# Patient Record
Sex: FEMALE | Race: WHITE | NOT HISPANIC OR LATINO | URBAN - NONMETROPOLITAN AREA
[De-identification: names, ages, dates, MRNs, and addresses within clinical notes are randomized per-mention and may not be internally consistent; named-entity substitution may affect disease eponyms.]

---

## 2022-08-18 ENCOUNTER — OFFICE VISIT (OUTPATIENT)
Dept: FAMILY MEDICINE CLINIC | Age: 72
End: 2022-08-18

## 2022-08-18 ENCOUNTER — TELEPHONE (OUTPATIENT)
Dept: FAMILY MEDICINE CLINIC | Age: 72
End: 2022-08-18

## 2022-08-18 VITALS
DIASTOLIC BLOOD PRESSURE: 68 MMHG | WEIGHT: 149 LBS | BODY MASS INDEX: 24.83 KG/M2 | TEMPERATURE: 98.6 F | HEIGHT: 65 IN | HEART RATE: 113 BPM | RESPIRATION RATE: 20 BRPM | OXYGEN SATURATION: 98 % | SYSTOLIC BLOOD PRESSURE: 136 MMHG

## 2022-08-18 DIAGNOSIS — R05.9 COUGH: ICD-10-CM

## 2022-08-18 DIAGNOSIS — U07.1 FEVER DUE TO COVID-19: ICD-10-CM

## 2022-08-18 DIAGNOSIS — R50.81 FEVER DUE TO COVID-19: ICD-10-CM

## 2022-08-18 DIAGNOSIS — U07.1 COVID-19 VIRUS DETECTED: Primary | ICD-10-CM

## 2022-08-18 DIAGNOSIS — R13.10 ODYNOPHAGIA: ICD-10-CM

## 2022-08-18 PROCEDURE — 99214 OFFICE O/P EST MOD 30 MIN: CPT | Performed by: FAMILY MEDICINE

## 2022-08-18 RX ORDER — AMLODIPINE BESYLATE 5 MG/1
5 TABLET ORAL DAILY
COMMUNITY
End: 2022-09-08 | Stop reason: SDUPTHER

## 2022-08-18 RX ORDER — OMEPRAZOLE 20 MG/1
20 CAPSULE, DELAYED RELEASE ORAL DAILY
COMMUNITY
End: 2022-09-08 | Stop reason: SDUPTHER

## 2022-08-18 RX ORDER — PRENATAL VIT NO.126/IRON/FOLIC 28MG-0.8MG
1 TABLET ORAL DAILY
COMMUNITY

## 2022-08-18 RX ORDER — VENLAFAXINE 75 MG/1
75 TABLET ORAL
COMMUNITY
End: 2022-09-08 | Stop reason: SDUPTHER

## 2022-08-18 RX ORDER — ACETAMINOPHEN 325 MG/1
325 TABLET ORAL EVERY 4 HOURS PRN
COMMUNITY

## 2022-08-18 RX ORDER — PYRIDOXINE HCL (VITAMIN B6) 100 MG
4 TABLET ORAL DAILY
COMMUNITY

## 2022-08-18 RX ORDER — MULTIVIT-MIN/IRON/FOLIC ACID/K 18-600-40
1 CAPSULE ORAL DAILY
COMMUNITY

## 2022-08-18 RX ORDER — MELOXICAM 15 MG/1
15 TABLET ORAL DAILY PRN
COMMUNITY
End: 2022-09-08 | Stop reason: SDUPTHER

## 2022-08-18 RX ORDER — SIMVASTATIN 20 MG
20 TABLET ORAL NIGHTLY
COMMUNITY
End: 2022-09-08 | Stop reason: SDUPTHER

## 2022-08-18 RX ORDER — METOPROLOL SUCCINATE 100 MG/1
100 TABLET, EXTENDED RELEASE ORAL DAILY
COMMUNITY
End: 2022-09-08 | Stop reason: SDUPTHER

## 2022-08-18 NOTE — PROGRESS NOTES
Chief Complaint  Cough and Fever - onset 8/14/22    History of Present Illness   (Positive COVID test result brought in and reviewed)  Nose:  Epistaxis: no.  Nasal drainage: no. Nasal congestion: no. Post nasal drip: no.  Sinusitis:  Facial pain: no.  Headache: yes  Ear:  Pain: no.  Drainage: no.  Throat:   Soreness: no.  Dysphagia.  Lungs:  Cough: yes.  Pleuritic pain: no.  Smoker: no.  General:  Myalgias: no. Malaise: yes.   Fever: 101.0  Chills: no.   Gastroenterology:  Diarrhea: no. Nausea: no. Vomiting: no    Review of Systems : As in HPI    Objective     Vital Signs:   There were no vitals taken for this visit.  No current outpatient medications on file prior to visit.     No current facility-administered medications on file prior to visit.        No past medical history on file.   No past surgical history on file.   No family history on file.   Social History     Socioeconomic History   • Marital status: Single         No visits with results within 3 Month(s) from this visit.   Latest known visit with results is:   No results found for any previous visit.         Physical Exam  Constitutional:       Comments: No acute distress.  Good energy level.  Well kept   HENT:      Right Ear: Tympanic membrane, ear canal and external ear normal.      Left Ear: Tympanic membrane, ear canal and external ear normal.      Nose:      Right Sinus: No maxillary sinus tenderness or frontal sinus tenderness.      Left Sinus: No maxillary sinus tenderness or frontal sinus tenderness.      Comments: Breathing comfortably through nose.  2+ edema right, 2+ edema left.  Normal pink nasal mucosa, no mucous.     Mouth/Throat:      Comments: No oral lesions.  Moist mucous membranes. No mucous draining down the posterior oropharyngeal wall.  No erythema or exudate.  No laryngitis.  Eyes:      Conjunctiva/sclera: Conjunctivae normal.      Comments: No drainage.  No lid lesions or edema   Neck:      Comments: No neck mass.  Neck is  supple  Cardiovascular:      Rate and Rhythm: Normal rate and regular rhythm.   Pulmonary:      Comments: Clear to auscultation throughout.  Excellent air movement  Abdominal:      Comments: Bowel sounds normal pitch and frequency.  Abdomen is soft and nontender   Lymphadenopathy:      Head:      Right side of head: No preauricular, posterior auricular or occipital adenopathy.      Left side of head: No preauricular, posterior auricular or occipital adenopathy.      Cervical:      Right cervical: No superficial, deep or posterior cervical adenopathy.     Left cervical: No superficial, deep or posterior cervical adenopathy.   Skin:     General: Skin is warm and dry.      Comments: Warm, dry, pink. No exanthema or lesions          Result Review  Data Reviewed:{ Labs  Result Review  Imaging  Med Tab  Media :23}                     Assessment and Plan {CC Problem List  Visit Diagnosis  ROS  Review (Popup)  [facility] Maintenance  Quality  BestPractice  Medications  SmartSets  SnapShot Encounters  Media :23}   Diagnoses and all orders for this visit:    1. COVID-19 virus detected (Primary)  -     Nirmatrelvir & Ritonavir (Paxlovid) 20 x 150 MG & 10 x 100MG tablet therapy pack tablet; Take 3 tablets by mouth 2 (Two) Times a Day.  Dispense: 6 tablet; Refill: 0  MDM:  Marylin is within 5 days on onset of infection.  Due to her age and history of inflammatory arthritis, she is at risk of decompensation and meets criteria for antiviral agent. Patient was educated on Paxlovid and requests the agent to be prescribed.    2. Cough.      Cough is not interruptive to sleep.  Therefore, over-the-counter cough syrup or         drop may be taken as needed for comfort    3. Fever due to COVID-19      Tylenol 500 mg every 6 hrs, as needed.  Patient is instructed not to exceed 4 gm/day    4. Odynophagia      Instructions given on throat lozenges or Chloraseptic spray/lozengers. Patient is to seek medical care if it becomes  difficult to swallow          Follow Up {Instructions Charge Capture  Follow-up Communications :23}     Patient was given instructions and counseling regarding her condition or for health maintenance advice. Please see specific information pulled into the AVS (placed there by myself) if appropriate.    No follow-ups on file.    MDM     Florencia Turner MD, FNP-BC

## 2022-08-18 NOTE — TELEPHONE ENCOUNTER
PATIENT CALLED TO INFORM YOU THAT SHE IS CURRENTLY ON DAY 5 OF TESTING POSITIVE FOR COVID. SHE IS NOT HAVING ANY FEVERS, AND FEELS BETTER, BUT IS REQUESTING MEDICATION TO HELP TREAT THIS. PLEASE ADVISE.

## 2022-08-18 NOTE — PATIENT INSTRUCTIONS
Rest.  Self quarantine x 5 days.  Tylenol 500 mg every 6 hrs as needed for fever or body aches.  Call or go to the ER if there is worsening of your symptoms.

## 2022-09-08 ENCOUNTER — OFFICE VISIT (OUTPATIENT)
Dept: FAMILY MEDICINE CLINIC | Age: 72
End: 2022-09-08

## 2022-09-08 VITALS
RESPIRATION RATE: 16 BRPM | HEART RATE: 71 BPM | DIASTOLIC BLOOD PRESSURE: 76 MMHG | HEIGHT: 65 IN | OXYGEN SATURATION: 98 % | WEIGHT: 149.8 LBS | TEMPERATURE: 98.6 F | BODY MASS INDEX: 24.96 KG/M2 | SYSTOLIC BLOOD PRESSURE: 124 MMHG

## 2022-09-08 DIAGNOSIS — E78.2 MIXED HYPERLIPIDEMIA: ICD-10-CM

## 2022-09-08 DIAGNOSIS — K21.9 GASTROESOPHAGEAL REFLUX DISEASE WITHOUT ESOPHAGITIS: ICD-10-CM

## 2022-09-08 DIAGNOSIS — M06.4 INFLAMMATORY POLYARTHROPATHY: ICD-10-CM

## 2022-09-08 DIAGNOSIS — I10 BENIGN HYPERTENSION: Primary | ICD-10-CM

## 2022-09-08 DIAGNOSIS — F41.1 GAD (GENERALIZED ANXIETY DISORDER): ICD-10-CM

## 2022-09-08 DIAGNOSIS — F32.5 MAJOR DEPRESSIVE DISORDER IN REMISSION, UNSPECIFIED WHETHER RECURRENT: ICD-10-CM

## 2022-09-08 PROCEDURE — 80061 LIPID PANEL: CPT | Performed by: FAMILY MEDICINE

## 2022-09-08 PROCEDURE — 80053 COMPREHEN METABOLIC PANEL: CPT | Performed by: FAMILY MEDICINE

## 2022-09-08 PROCEDURE — 99214 OFFICE O/P EST MOD 30 MIN: CPT | Performed by: FAMILY MEDICINE

## 2022-09-08 RX ORDER — OMEPRAZOLE 20 MG/1
20 CAPSULE, DELAYED RELEASE ORAL DAILY
Qty: 90 CAPSULE | Refills: 0 | Status: SHIPPED | OUTPATIENT
Start: 2022-09-08 | End: 2022-09-20 | Stop reason: SDUPTHER

## 2022-09-08 RX ORDER — VENLAFAXINE 75 MG/1
75 TABLET ORAL
Qty: 90 TABLET | Refills: 0 | Status: SHIPPED | OUTPATIENT
Start: 2022-09-08 | End: 2022-09-20 | Stop reason: SDUPTHER

## 2022-09-08 RX ORDER — MELOXICAM 15 MG/1
15 TABLET ORAL DAILY PRN
Qty: 90 TABLET | Refills: 0 | Status: SHIPPED | OUTPATIENT
Start: 2022-09-08 | End: 2022-09-20 | Stop reason: SDUPTHER

## 2022-09-08 RX ORDER — SIMVASTATIN 20 MG
20 TABLET ORAL NIGHTLY
Qty: 90 TABLET | Refills: 0 | Status: SHIPPED | OUTPATIENT
Start: 2022-09-08 | End: 2022-09-20 | Stop reason: SDUPTHER

## 2022-09-08 RX ORDER — AMLODIPINE BESYLATE 5 MG/1
5 TABLET ORAL DAILY
Qty: 90 TABLET | Refills: 0 | Status: SHIPPED | OUTPATIENT
Start: 2022-09-08 | End: 2022-09-20 | Stop reason: SDUPTHER

## 2022-09-08 RX ORDER — METOPROLOL SUCCINATE 100 MG/1
100 TABLET, EXTENDED RELEASE ORAL DAILY
Qty: 90 TABLET | Refills: 0 | Status: SHIPPED | OUTPATIENT
Start: 2022-09-08 | End: 2022-09-20 | Stop reason: SDUPTHER

## 2022-09-08 NOTE — ASSESSMENT & PLAN NOTE
MDM: Hypertension is stable.  Limit sodium, elevate feet when needed, use compression knee high stockings (over-the-counter as discussed, including runners's stockings). Omit diuretic, at this time, as it can lower BP further and result in light headiness

## 2022-09-08 NOTE — PROGRESS NOTES
Venipuncture Blood Specimen Collection  Venipuncture performed in (R) arm via butterfly by Florencia Turner MD  with good hemostasis. Patient tolerated the procedure well without complications.   09/08/22   Marilou Mireles LPN

## 2022-09-08 NOTE — PROGRESS NOTES
Marylin Gay presents to [facility]      Chief Complaint  Hypertension, Hyperlipidemia  CMP, Lipid    HPI Doing well. Numbness lateral half of right foot.  Spine surgery did not decrease the numbness.   Mood is good.  No issues with medications.    Respiratory:  SOA:  no. Exertional dyspnea: no.  Dyspnea at rest: no.  Flights of stairs climbable: 2.  Cardiology:  Chest pain: no.  Dizziness: no. Easily fatigued: no.  Pedal edema: Rt leg swelling, intermittently since Total Rt knee replacement.  Occasional left lower leg swelling.  Both occur at the end of the day, if she hasn't rested .  Light-headiness: no.  Symptoms of claudication: no.  Orthopnea: no.  Palpitations: no. Tachycardia: no.  Ophthalmology:  Last eye exam date: 9/22 . Vision changes: none.  Gastroenterology:  Heartburn: no.  Nausea: no.      Current Outpatient Medications:   •  acetaminophen (TYLENOL) 325 MG tablet, Take 325 mg by mouth Every 4 (Four) Hours As Needed (INFLAMMATORY ARTHRITIS)., Disp: , Rfl:   •  amLODIPine (NORVASC) 5 MG tablet, Take 1 tablet by mouth Daily., Disp: 90 tablet, Rfl: 0  •  Ascorbic Acid (Vitamin C) 500 MG capsule, Take 1 capsule by mouth Daily., Disp: , Rfl:   •  Cranberry 500 MG capsule, Take 4 capsules by mouth Daily., Disp: , Rfl:   •  meloxicam (MOBIC) 15 MG tablet, Take 1 tablet by mouth Daily As Needed (ARTHRITIS)., Disp: 90 tablet, Rfl: 0  •  metoprolol succinate XL (TOPROL-XL) 100 MG 24 hr tablet, Take 1 tablet by mouth Daily., Disp: 90 tablet, Rfl: 0  •  omeprazole (priLOSEC) 20 MG capsule, Take 1 capsule by mouth Daily., Disp: 90 capsule, Rfl: 0  •  prenatal vitamin (prenatal, CLASSIC, vitamin) tablet, Take 1 tablet by mouth Daily., Disp: , Rfl:   •  simvastatin (ZOCOR) 20 MG tablet, Take 1 tablet by mouth Every Night., Disp: 90 tablet, Rfl: 0  •  venlafaxine (EFFEXOR) 75 MG tablet, Take 1 tablet by mouth Daily With Breakfast., Disp: 90 tablet, Rfl: 0     Allergies   Allergen  "Reactions   • Ertapenem Other (See Comments) and Delirium     VISION CHANGES (SEEING LIGHTS)   • Macrobid [Nitrofurantoin] Hives   • Methotrexate Other (See Comments)     TONGUE LESION    • Celebrex [Celecoxib] Other (See Comments)     ARTHRALGIA    • Ace Inhibitors Cough     (SIDE EFFECT)        Past Medical History:   Diagnosis Date   • Anxiety    • Depression    • GERD (gastroesophageal reflux disease)    • History of melanoma    • Hyperlipidemia    • Hypertension    • Inflammatory arthritis    • Insomnia    • Lumbar herniated disc     RIGHT SIDE - L5-S1       Past Surgical History:   Procedure Laterality Date   • APPENDECTOMY N/A    • CHOLECYSTECTOMY N/A    • ENDOMETRIAL ABLATION          History reviewed. No pertinent family history.     Social History     Socioeconomic History   • Marital status: Single   Tobacco Use   • Smoking status: Former Smoker     Packs/day: 0.50     Years: 10.00     Pack years: 5.00     Types: Cigarettes     Start date:      Quit date:      Years since quittin.7   • Smokeless tobacco: Never Used   Vaping Use   • Vaping Use: Never used   Substance and Sexual Activity   • Alcohol use: Never   • Drug use: Never   • Sexual activity: Defer        ROS: As in HPI      Objective   Vital Signs:  /76 (BP Location: Left arm, Patient Position: Sitting, Cuff Size: Adult)   Pulse 71   Temp 98.6 °F (37 °C) (Infrared)   Resp 16   Ht 165.1 cm (65\")   Wt 67.9 kg (149 lb 12.8 oz)   SpO2 98%   BMI 24.93 kg/m²   Estimated body mass index is 24.93 kg/m² as calculated from the following:    Height as of this encounter: 165.1 cm (65\").    Weight as of this encounter: 67.9 kg (149 lb 12.8 oz).        Physical Exam  Constitutional:       Comments: No acute distress. Well kept. Pleasant, cheerful, relaxed, interactive, coherant   Neck:      Vascular: No JVD.   Cardiovascular:      Rate and Rhythm: Normal rate and regular rhythm.      Pulses:           Carotid " pulses are 2+ on the right side and 2+ on the left side.       Radial pulses are 2+ on the right side and 2+ on the left side.        Femoral pulses are 0 on the right side and 0 on the left side.       Popliteal pulses are 2+ on the right side and 2+ on the left side.        Dorsalis pedis pulses are detected w/ Doppler on the right side and 2+ on the left side.        Posterior tibial pulses are 2+ on the right side and 2+ on the left side.      Heart sounds: Normal heart sounds.      Comments: No lower extremity varicosities  Pulmonary:      Effort: Pulmonary effort is normal.      Breath sounds: Normal breath sounds.   Musculoskeletal:      Right lower leg: No edema.      Left lower leg: No edema.   Skin:     Comments: No dystrophic toenails. No integument changes of the shins or feet.  Toes are warm and pink         Result Review :                    Assessment and Plan   Diagnoses and all orders for this visit:    1. Benign hypertension (Primary)  Assessment & Plan:  MDM: Hypertension is stable.  Limit sodium, elevate feet when needed, use compression knee high stockings (over-the-counter as discussed, including runners's stockings). Omit diuretic, at this time, as it can lower BP further and result in light headiness    Orders:  -     amLODIPine (NORVASC) 5 MG tablet; Take 1 tablet by mouth Daily.  Dispense: 90 tablet; Refill: 0  -     metoprolol succinate XL (TOPROL-XL) 100 MG 24 hr tablet; Take 1 tablet by mouth Daily.  Dispense: 90 tablet; Refill: 0  -     Comprehensive metabolic panel    2. Mixed hyperlipidemia  -     simvastatin (ZOCOR) 20 MG tablet; Take 1 tablet by mouth Every Night.  Dispense: 90 tablet; Refill: 0  -     Lipid Panel    3. HEAVEN (generalized anxiety disorder)    4. Major depressive disorder in remission, unspecified whether recurrent (HCC)  -     venlafaxine (EFFEXOR) 75 MG tablet; Take 1 tablet by mouth Daily With Breakfast.  Dispense: 90 tablet; Refill: 0    5. Gastroesophageal reflux  disease without esophagitis  -     omeprazole (priLOSEC) 20 MG capsule; Take 1 capsule by mouth Daily.  Dispense: 90 capsule; Refill: 0    6. Inflammatory polyarthropathy (HCC)  -     meloxicam (MOBIC) 15 MG tablet; Take 1 tablet by mouth Daily As Needed (ARTHRITIS).  Dispense: 90 tablet; Refill: 0           Follow Up   Return in about 3 months (around 12/8/2022) for Cor.    Patient was given instructions and counseling regarding her condition or for health maintenance advice. Please see specific information pulled into the AVS if appropriate.   There are no Patient Instructions on file for this visit.

## 2022-09-09 LAB
ALBUMIN SERPL-MCNC: 4.6 G/DL (ref 3.5–5.2)
ALBUMIN/GLOB SERPL: 2.1 G/DL
ALP SERPL-CCNC: 92 U/L (ref 39–117)
ALT SERPL W P-5'-P-CCNC: 16 U/L (ref 1–33)
ANION GAP SERPL CALCULATED.3IONS-SCNC: 11.8 MMOL/L (ref 5–15)
AST SERPL-CCNC: 22 U/L (ref 1–32)
BILIRUB SERPL-MCNC: 0.3 MG/DL (ref 0–1.2)
BUN SERPL-MCNC: 18 MG/DL (ref 8–23)
BUN/CREAT SERPL: 18.6 (ref 7–25)
CALCIUM SPEC-SCNC: 9.5 MG/DL (ref 8.6–10.5)
CHLORIDE SERPL-SCNC: 108 MMOL/L (ref 98–107)
CHOLEST SERPL-MCNC: 179 MG/DL (ref 0–200)
CO2 SERPL-SCNC: 24.2 MMOL/L (ref 22–29)
CREAT SERPL-MCNC: 0.97 MG/DL (ref 0.57–1)
EGFRCR SERPLBLD CKD-EPI 2021: 63 ML/MIN/1.73
GLOBULIN UR ELPH-MCNC: 2.2 GM/DL
GLUCOSE SERPL-MCNC: 67 MG/DL (ref 65–99)
HDLC SERPL-MCNC: 40 MG/DL (ref 40–60)
LDLC SERPL CALC-MCNC: 99 MG/DL (ref 0–100)
LDLC/HDLC SERPL: 2.3 {RATIO}
POTASSIUM SERPL-SCNC: 4.4 MMOL/L (ref 3.5–5.2)
PROT SERPL-MCNC: 6.8 G/DL (ref 6–8.5)
SODIUM SERPL-SCNC: 144 MMOL/L (ref 136–145)
TRIGL SERPL-MCNC: 235 MG/DL (ref 0–150)
VLDLC SERPL-MCNC: 40 MG/DL (ref 5–40)

## 2022-09-20 DIAGNOSIS — I10 BENIGN HYPERTENSION: ICD-10-CM

## 2022-09-20 DIAGNOSIS — F32.5 MAJOR DEPRESSIVE DISORDER IN REMISSION, UNSPECIFIED WHETHER RECURRENT: ICD-10-CM

## 2022-09-20 DIAGNOSIS — E78.2 MIXED HYPERLIPIDEMIA: ICD-10-CM

## 2022-09-20 DIAGNOSIS — K21.9 GASTROESOPHAGEAL REFLUX DISEASE WITHOUT ESOPHAGITIS: ICD-10-CM

## 2022-09-20 DIAGNOSIS — M06.4 INFLAMMATORY POLYARTHROPATHY: ICD-10-CM

## 2022-09-20 RX ORDER — MELOXICAM 15 MG/1
15 TABLET ORAL DAILY PRN
Qty: 90 TABLET | Refills: 0 | Status: SHIPPED | OUTPATIENT
Start: 2022-09-20 | End: 2022-12-08 | Stop reason: SDUPTHER

## 2022-09-20 RX ORDER — AMLODIPINE BESYLATE 5 MG/1
5 TABLET ORAL DAILY
Qty: 90 TABLET | Refills: 0 | Status: SHIPPED | OUTPATIENT
Start: 2022-09-20 | End: 2022-12-08 | Stop reason: SDUPTHER

## 2022-09-20 RX ORDER — METOPROLOL SUCCINATE 100 MG/1
100 TABLET, EXTENDED RELEASE ORAL DAILY
Qty: 90 TABLET | Refills: 0 | Status: SHIPPED | OUTPATIENT
Start: 2022-09-20 | End: 2022-12-08 | Stop reason: SDUPTHER

## 2022-09-20 RX ORDER — OMEPRAZOLE 20 MG/1
20 CAPSULE, DELAYED RELEASE ORAL DAILY
Qty: 90 CAPSULE | Refills: 0 | Status: SHIPPED | OUTPATIENT
Start: 2022-09-20 | End: 2022-12-08 | Stop reason: SDUPTHER

## 2022-09-20 RX ORDER — SIMVASTATIN 20 MG
20 TABLET ORAL NIGHTLY
Qty: 90 TABLET | Refills: 0 | Status: SHIPPED | OUTPATIENT
Start: 2022-09-20 | End: 2022-12-08 | Stop reason: SDUPTHER

## 2022-09-20 RX ORDER — VENLAFAXINE 75 MG/1
75 TABLET ORAL
Qty: 90 TABLET | Refills: 0 | Status: SHIPPED | OUTPATIENT
Start: 2022-09-20 | End: 2022-10-04

## 2022-09-20 NOTE — TELEPHONE ENCOUNTER
Rx Refill Note  Requested Prescriptions     Pending Prescriptions Disp Refills   • amLODIPine (NORVASC) 5 MG tablet 90 tablet 0     Sig: Take 1 tablet by mouth Daily.   • meloxicam (MOBIC) 15 MG tablet 90 tablet 0     Sig: Take 1 tablet by mouth Daily As Needed (ARTHRITIS).   • omeprazole (priLOSEC) 20 MG capsule 90 capsule 0     Sig: Take 1 capsule by mouth Daily.   • metoprolol succinate XL (TOPROL-XL) 100 MG 24 hr tablet 90 tablet 0     Sig: Take 1 tablet by mouth Daily.   • simvastatin (ZOCOR) 20 MG tablet 90 tablet 0     Sig: Take 1 tablet by mouth Every Night.   • venlafaxine (EFFEXOR) 75 MG tablet 90 tablet 0     Sig: Take 1 tablet by mouth Daily With Breakfast.      Last office visit with prescribing clinician: 9/8/2022      Next office visit with prescribing clinician: 12/8/2022            Jia Wilson Rep  09/20/22, 12:50 EDT

## 2022-10-04 DIAGNOSIS — F32.5 MAJOR DEPRESSIVE DISORDER IN REMISSION, UNSPECIFIED WHETHER RECURRENT: Primary | ICD-10-CM

## 2022-10-04 RX ORDER — VENLAFAXINE HYDROCHLORIDE 75 MG/1
75 CAPSULE, EXTENDED RELEASE ORAL DAILY
Qty: 90 CAPSULE | Refills: 0 | Status: SHIPPED | OUTPATIENT
Start: 2022-10-04 | End: 2022-12-08 | Stop reason: SDUPTHER

## 2022-10-04 NOTE — TELEPHONE ENCOUNTER
Received fax re: this pt's Effexor 75 mg. Fax advised that patient was previously on 75 mg XR capsules and that 75 mg tablets (not XR) were sent. Per Dr. Turner, Effexor 75 mg XR to be sent to [facility].   Rx Refill Note  Requested Prescriptions     Pending Prescriptions Disp Refills   • venlafaxine XR (Effexor XR) 75 MG 24 hr capsule 90 capsule 0     Sig: Take 1 capsule by mouth Daily.      Last office visit with prescribing clinician: 9/8/2022      Next office visit with prescribing clinician: 12/8/2022            Marilou Mireles LPN  10/04/22, 14:01 EDT

## 2022-10-12 DIAGNOSIS — E78.2 MIXED HYPERLIPIDEMIA: ICD-10-CM

## 2022-10-12 RX ORDER — SIMVASTATIN 20 MG
20 TABLET ORAL NIGHTLY
Qty: 30 TABLET | Refills: 0 | OUTPATIENT
Start: 2022-10-12

## 2022-10-12 NOTE — TELEPHONE ENCOUNTER
PATIENT IS CALLING BACK IN TO CHECK ON STATUS OF THIS MEDICATION, WANTED TO MAKE SURE IT HAS ALL BEEN WORKED OUT BEFORE SHE RUNS OUT.      PLEASE ADVISE    CALLBACK NUMBER IS  [phone]

## 2022-10-12 NOTE — TELEPHONE ENCOUNTER
Rx Refill Note      Requested Prescriptions     Pending Prescriptions Disp Refills   • simvastatin (ZOCOR) 20 MG tablet 30 tablet 0     Sig: Take 1 tablet by mouth Every Night.      Last office visit with prescribing clinician: 9/8/2022      Next office visit with prescribing clinician: 12/8/2022            Lynnette Blair MA  10/12/22, 14:18 EDT

## 2022-10-12 NOTE — TELEPHONE ENCOUNTER
Caller: Marylin Gay    Relationship: Self    Best call back number: [phone]      Requested Prescriptions:   Requested Prescriptions     Pending Prescriptions Disp Refills   • simvastatin (ZOCOR) 20 MG tablet 90 tablet 0     Sig: Take 1 tablet by mouth Every Night.        Pharmacy where request should be sent: [facility] - [id number] - [phone]  - [phone] FX     Additional details provided by patient: HAS MORE THAN 3 DAYS LEFT.    Does the patient have less than a 3 day supply:  [] Yes  [x] No    Ava Alvarez, PCT   10/12/22 14:09 EDT

## 2022-11-23 ENCOUNTER — OFFICE VISIT (OUTPATIENT)
Dept: FAMILY MEDICINE CLINIC | Age: 72
End: 2022-11-23

## 2022-11-23 VITALS
DIASTOLIC BLOOD PRESSURE: 70 MMHG | OXYGEN SATURATION: 97 % | SYSTOLIC BLOOD PRESSURE: 118 MMHG | WEIGHT: 154 LBS | HEART RATE: 58 BPM | HEIGHT: 65 IN | TEMPERATURE: 98.4 F | RESPIRATION RATE: 18 BRPM | BODY MASS INDEX: 25.66 KG/M2

## 2022-11-23 DIAGNOSIS — H69.82 EUSTACHIAN TUBE DYSFUNCTION, LEFT: ICD-10-CM

## 2022-11-23 DIAGNOSIS — H92.09 EARACHE: Primary | ICD-10-CM

## 2022-11-23 DIAGNOSIS — B30.9 VIRAL CONJUNCTIVITIS OF LEFT EYE: ICD-10-CM

## 2022-11-23 LAB
EXPIRATION DATE: NORMAL
FLUAV AG UPPER RESP QL IA.RAPID: NOT DETECTED
FLUBV AG UPPER RESP QL IA.RAPID: NOT DETECTED
INTERNAL CONTROL: NORMAL
Lab: NORMAL
SARS-COV-2 AG UPPER RESP QL IA.RAPID: NOT DETECTED

## 2022-11-23 PROCEDURE — 87428 SARSCOV & INF VIR A&B AG IA: CPT | Performed by: FAMILY MEDICINE

## 2022-11-23 PROCEDURE — 99214 OFFICE O/P EST MOD 30 MIN: CPT | Performed by: FAMILY MEDICINE

## 2022-11-23 NOTE — PROGRESS NOTES
"Chief Complaint  Earache (Started 3-4 days ago) and Conjunctivitis (Started yesterday)    History of Present Illness   Ears:    Pain: no.  Drainage: no  Nose:  Epistaxis: intermittent. Nasal drainage: yes. Nasal congestion: no. Post nasal drip: no. Sneezing: no.  Sinusitis:  Facial pain: no.  Headache: no.   Ear:  Pain: left.  Drainage: no. \"Decreased hearing in the left\"  Throat:   Soreness: no.  Dysphagia.  Lungs:  Cough: intermittent.  Pleuritic pain: no.  Smoker: no.  General:  Myalgias: no. Malaise: yes.   Fever: no.  Chills: no.   Gastroenterology:  Diarrhea: no. Nausea: no. Vomiting: no  EYES:  Yellow drainage from the left eye    Objective     Vital Signs:   /70 (BP Location: Left arm, Patient Position: Sitting, Cuff Size: Adult)   Pulse 58   Temp 98.4 °F (36.9 °C) (Temporal)   Resp 18   Ht 165.1 cm (65\")   Wt 69.9 kg (154 lb)   SpO2 97%   BMI 25.63 kg/m²   Current Outpatient Medications on File Prior to Visit   Medication Sig Dispense Refill   • acetaminophen (TYLENOL) 325 MG tablet Take 325 mg by mouth Every 4 (Four) Hours As Needed (INFLAMMATORY ARTHRITIS).     • amLODIPine (NORVASC) 5 MG tablet Take 1 tablet by mouth Daily. 90 tablet 0   • Ascorbic Acid (Vitamin C) 500 MG capsule Take 1 capsule by mouth Daily.     • Cranberry 500 MG capsule Take 4 capsules by mouth Daily.     • meloxicam (MOBIC) 15 MG tablet Take 1 tablet by mouth Daily As Needed (ARTHRITIS). 90 tablet 0   • metoprolol succinate XL (TOPROL-XL) 100 MG 24 hr tablet Take 1 tablet by mouth Daily. 90 tablet 0   • omeprazole (priLOSEC) 20 MG capsule Take 1 capsule by mouth Daily. 90 capsule 0   • prenatal vitamin (prenatal, CLASSIC, vitamin) tablet Take 1 tablet by mouth Daily.     • simvastatin (ZOCOR) 20 MG tablet Take 1 tablet by mouth Every Night. 90 tablet 0   • venlafaxine XR (Effexor XR) 75 MG 24 hr capsule Take 1 capsule by mouth Daily. 90 capsule 0     No current facility-administered medications on file prior to visit. "        Past Medical History:   Diagnosis Date   • Anxiety    • Depression    • GERD (gastroesophageal reflux disease)    • History of melanoma    • Hyperlipidemia    • Hypertension    • Inflammatory arthritis    • Insomnia    • Lumbar herniated disc     RIGHT SIDE - L5-S1      Past Surgical History:   Procedure Laterality Date   • APPENDECTOMY N/A    • BLADDER SUSPENSION     • CARPAL TUNNEL RELEASE Bilateral     2007   • CATARACT EXTRACTION Bilateral    • CHOLECYSTECTOMY N/A    • COLONOSCOPY  2020    Negative   • ENDOMETRIAL ABLATION     • LAMINECTOMY AND MICRODISCECTOMY LUMBAR SPINE  2021    herniated disc   • REPLACEMENT TOTAL KNEE Right 2022   • TOTAL LAPAROSCOPIC HYSTERECTOMY        Family History   Problem Relation Age of Onset   • Diabetes Mother    • Heart failure Mother    • Ulcers Father    • Skin cancer Father    • Other Father         Heart issues   • No Known Problems Sister    • Arthritis Sister    • Alzheimer's disease Sister    • No Known Problems Sister    • Lung cancer Brother    • Other Brother         Heart issues   • Hypertension Brother    • Post-traumatic stress disorder Brother    • Other Brother         Heart issues   • Diabetes Brother    • No Known Problems Maternal Grandmother       Social History     Socioeconomic History   • Marital status: Single   Tobacco Use   • Smoking status: Former     Packs/day: 0.50     Years: 10.00     Pack years: 5.00     Types: Cigarettes     Start date:      Quit date:      Years since quittin.9     Passive exposure: Past   • Smokeless tobacco: Never   Vaping Use   • Vaping Use: Never used   Substance and Sexual Activity   • Alcohol use: Yes     Comment: Rare occasion   • Drug use: Never   • Sexual activity: Defer         Office Visit on 2022   Component Date Value Ref Range Status   • SARS Antigen 2022 Not Detected  Not Detected, Presumptive Negative Final   • Influenza A Antigen  DARLINE 11/23/2022 Not Detected  Not Detected Final   • Influenza B Antigen DARLINE 11/23/2022 Not Detected  Not Detected Final   • Internal Control 11/23/2022 Passed  Passed Final   • Lot Number 11/23/2022 [id number]   Final   • Expiration Date 11/23/2022 06-   Final   Office Visit on 09/08/2022   Component Date Value Ref Range Status   • Glucose 09/08/2022 67  65 - 99 mg/dL Final   • BUN 09/08/2022 18  8 - 23 mg/dL Final   • Creatinine 09/08/2022 0.97  0.57 - 1.00 mg/dL Final   • Sodium 09/08/2022 144  136 - 145 mmol/L Final   • Potassium 09/08/2022 4.4  3.5 - 5.2 mmol/L Final   • Chloride 09/08/2022 108 (H)  98 - 107 mmol/L Final   • CO2 09/08/2022 24.2  22.0 - 29.0 mmol/L Final   • Calcium 09/08/2022 9.5  8.6 - 10.5 mg/dL Final   • Total Protein 09/08/2022 6.8  6.0 - 8.5 g/dL Final   • Albumin 09/08/2022 4.60  3.50 - 5.20 g/dL Final   • ALT (SGPT) 09/08/2022 16  1 - 33 U/L Final   • AST (SGOT) 09/08/2022 22  1 - 32 U/L Final   • Alkaline Phosphatase 09/08/2022 92  39 - 117 U/L Final   • Total Bilirubin 09/08/2022 0.3  0.0 - 1.2 mg/dL Final   • Globulin 09/08/2022 2.2  gm/dL Final   • A/G Ratio 09/08/2022 2.1  g/dL Final   • BUN/Creatinine Ratio 09/08/2022 18.6  7.0 - 25.0 Final   • Anion Gap 09/08/2022 11.8  5.0 - 15.0 mmol/L Final   • eGFR 09/08/2022 63.0  >60.0 mL/min/1.73 Final    National Kidney Foundation and American Society of Nephrology (ASN) Task Force recommended calculation based on the Chronic Kidney Disease Epidemiology Collaboration (CKD-EPI) equation refit without adjustment for race.   • Total Cholesterol 09/08/2022 179  0 - 200 mg/dL Final   • Triglycerides 09/08/2022 235 (H)  0 - 150 mg/dL Final   • HDL Cholesterol 09/08/2022 40  40 - 60 mg/dL Final   • LDL Cholesterol  09/08/2022 99  0 - 100 mg/dL Final   • VLDL Cholesterol 09/08/2022 40  5 - 40 mg/dL Final   • LDL/HDL Ratio 09/08/2022 2.30   Final         Physical Exam   General:  No acute distress,  good energy level, interactive, cooperative  with exam.  Eyes:  Normal.  Ears:  Canals: normal  TM: intact, no erythema, air fluid level, bulging, dilated vessels  Nose:  Breathing comfortably through the nose. Scant clear mucous. Normal pink mucosa, 2+ edema bilaterally.  Sinuses:  Frontal: non-tender  Maxillary: non-tender  Buccal Cavity:  Moist membranes  No oral lesions  Throat:  No mucous draining down the posterior oropharyngeal wall. No erythema. No exudate.  No laryngitis  Lungs:  Clear to auscultation bilaterally, excellent air movement throughout  Voice strong and clear  Cor:  Heart regular rate and rhythm without murmur   Abdomen:  Bowel sounds are normal pitch and frequency x 4 quadrants  No tenderness. No palpable mass.  Lymph nodes:  No enlarged anterior or posterior cervical lymph nodes  Neck:  Supple.  No palpable mass  Integument:  Warm, dry, pink without exanthema   Eyes:  Very minimal left orbit conjunctival injection. No pseudomembranes, lid lesions, drainage or lid edema/erythema      Result Review  Data Reviewed:{ Labs  Result Review  Imaging  Med Tab  Media :23}                     Assessment and Plan {CC Problem List  Visit Diagnosis  ROS  Review (Popup)  Health Maintenance  Quality  BestPractice  Medications  SmartSets  SnapShot Encounters  Media :23}   Diagnoses and all orders for this visit:    1. Earache (Primary)  Comments:  MDM: Secondary to eustachian tube dysfunction from URI  Orders:  -     POCT SARS-CoV-2 Antigen DARLINE + Flu    2. Eustachian tube dysfunction, left  Comments:  MDM: Obtain Flonase, 1 spray ea side daily.  Triple antibiotic ointment to mucosa, at bedtime, if bleeding occurs    3. Viral conjunctivitis of left eye  Comments:  MDM: Use warm moist compress 5 min hourly until bedtime.  Call if no resolution          Follow Up {Instructions Charge Capture  Follow-up Communications :23}     Patient was given instructions and counseling regarding her condition or for health maintenance advice. Please see  specific information pulled into the AVS (placed there by myself) if appropriate.    No follow-ups on file.    MDM     Florencia Turner MD, FNP-BC

## 2022-12-08 ENCOUNTER — OFFICE VISIT (OUTPATIENT)
Dept: FAMILY MEDICINE CLINIC | Age: 72
End: 2022-12-08

## 2022-12-08 VITALS
WEIGHT: 153.8 LBS | OXYGEN SATURATION: 98 % | HEIGHT: 65 IN | DIASTOLIC BLOOD PRESSURE: 80 MMHG | BODY MASS INDEX: 25.62 KG/M2 | SYSTOLIC BLOOD PRESSURE: 130 MMHG | RESPIRATION RATE: 20 BRPM | TEMPERATURE: 98.2 F | HEART RATE: 75 BPM

## 2022-12-08 DIAGNOSIS — M06.4 INFLAMMATORY POLYARTHROPATHY: ICD-10-CM

## 2022-12-08 DIAGNOSIS — I10 BENIGN HYPERTENSION: Primary | ICD-10-CM

## 2022-12-08 DIAGNOSIS — E78.2 MIXED HYPERLIPIDEMIA: ICD-10-CM

## 2022-12-08 DIAGNOSIS — Z23 ENCOUNTER FOR IMMUNIZATION: ICD-10-CM

## 2022-12-08 DIAGNOSIS — F32.5 MAJOR DEPRESSIVE DISORDER IN REMISSION, UNSPECIFIED WHETHER RECURRENT: ICD-10-CM

## 2022-12-08 DIAGNOSIS — K21.9 GASTROESOPHAGEAL REFLUX DISEASE WITHOUT ESOPHAGITIS: ICD-10-CM

## 2022-12-08 PROCEDURE — 90662 IIV NO PRSV INCREASED AG IM: CPT | Performed by: FAMILY MEDICINE

## 2022-12-08 PROCEDURE — G0008 ADMIN INFLUENZA VIRUS VAC: HCPCS | Performed by: FAMILY MEDICINE

## 2022-12-08 PROCEDURE — 99214 OFFICE O/P EST MOD 30 MIN: CPT | Performed by: FAMILY MEDICINE

## 2022-12-08 RX ORDER — SIMVASTATIN 20 MG
20 TABLET ORAL NIGHTLY
Qty: 90 TABLET | Refills: 0 | Status: CANCELLED | OUTPATIENT
Start: 2022-12-08

## 2022-12-08 RX ORDER — VENLAFAXINE HYDROCHLORIDE 75 MG/1
75 CAPSULE, EXTENDED RELEASE ORAL DAILY
Qty: 90 CAPSULE | Refills: 0 | Status: CANCELLED | OUTPATIENT
Start: 2022-12-08

## 2022-12-08 RX ORDER — OMEPRAZOLE 20 MG/1
20 CAPSULE, DELAYED RELEASE ORAL 2 TIMES DAILY
Qty: 90 CAPSULE | Refills: 0 | Status: CANCELLED | OUTPATIENT
Start: 2022-12-08

## 2022-12-08 RX ORDER — MELOXICAM 15 MG/1
15 TABLET ORAL DAILY PRN
Qty: 90 TABLET | Refills: 0 | Status: CANCELLED | OUTPATIENT
Start: 2022-12-08

## 2022-12-08 RX ORDER — VENLAFAXINE HYDROCHLORIDE 75 MG/1
75 CAPSULE, EXTENDED RELEASE ORAL DAILY
Qty: 90 CAPSULE | Refills: 0 | Status: SHIPPED | OUTPATIENT
Start: 2022-12-08 | End: 2023-01-10 | Stop reason: SDUPTHER

## 2022-12-08 RX ORDER — METOPROLOL SUCCINATE 100 MG/1
100 TABLET, EXTENDED RELEASE ORAL DAILY
Qty: 90 TABLET | Refills: 0 | Status: CANCELLED | OUTPATIENT
Start: 2022-12-08

## 2022-12-08 RX ORDER — OMEPRAZOLE 20 MG/1
20 CAPSULE, DELAYED RELEASE ORAL 2 TIMES DAILY
Qty: 90 CAPSULE | Refills: 0 | Status: SHIPPED | OUTPATIENT
Start: 2022-12-08 | End: 2023-03-02 | Stop reason: SDUPTHER

## 2022-12-08 RX ORDER — SIMVASTATIN 20 MG
20 TABLET ORAL NIGHTLY
Qty: 90 TABLET | Refills: 0 | Status: SHIPPED | OUTPATIENT
Start: 2022-12-08

## 2022-12-08 RX ORDER — METOPROLOL SUCCINATE 100 MG/1
100 TABLET, EXTENDED RELEASE ORAL DAILY
Qty: 90 TABLET | Refills: 0 | Status: SHIPPED | OUTPATIENT
Start: 2022-12-08

## 2022-12-08 RX ORDER — AMLODIPINE BESYLATE 5 MG/1
5 TABLET ORAL DAILY
Qty: 90 TABLET | Refills: 0 | Status: SHIPPED | OUTPATIENT
Start: 2022-12-08

## 2022-12-08 RX ORDER — MELOXICAM 15 MG/1
15 TABLET ORAL DAILY PRN
Qty: 90 TABLET | Refills: 0 | Status: SHIPPED | OUTPATIENT
Start: 2022-12-08

## 2022-12-08 RX ORDER — VENLAFAXINE HYDROCHLORIDE 37.5 MG/1
37.5 CAPSULE, EXTENDED RELEASE ORAL DAILY
Qty: 30 CAPSULE | Refills: 0 | Status: SHIPPED | OUTPATIENT
Start: 2022-12-08 | End: 2023-01-10 | Stop reason: SDUPTHER

## 2022-12-08 RX ORDER — AMLODIPINE BESYLATE 5 MG/1
5 TABLET ORAL DAILY
Qty: 90 TABLET | Refills: 0 | Status: CANCELLED | OUTPATIENT
Start: 2022-12-08

## 2022-12-08 NOTE — ASSESSMENT & PLAN NOTE
AUTHORIZATION FOR SURGICAL OPERATION OR OTHER PROCEDURE    1. I hereby authorize Dr. Dimas/Mario, and [facility] staff assigned to my case to perform the following operation and/or procedure at the [facility] site:    _______________________________________________________________________________________________    Right knee cortisone injection  _______________________________________________________________________________________________    2.  My physician has explained the nature and purpose of the operation or other procedure, possible alternative methods of treatment, the risks involved, and the possibility of complication to me.  I acknowledge that no guarantee has been made as to the result that may be obtained.  3.  I recognize that, during the course of this operation, or other procedure, unforseen conditions may necessitate additional or different procedure than those listed above.  I, therefore, further authorize and request that the above named physician, his/her physician assistants or designees perform such procedures as are, in his/her professional opinion, necessary and desirable.  4.  Any tissue or organs removed in the operation or other procedure may be disposed of by and at the discretion of the [facility] [address] [facility] and [facility].  5.  I understand that in the event of a medical emergency, I will be transported by local paramedics to [facility] or other hospital emergency department.  6.  I certify that I have read and fully understand the above consent to operation and/or other procedure.    7.  I acknowledge that my physician has explained sedation/analgesia administration to me including the risks and benefits.  I consent to the administration of sedation/analgesia as may be necessary or desirable in the judgement of my physician.    Witness signature: ___________________________________________________ Date:   MDM: Patient's depression is not in control.  Slowly titrate Rx as indicated   ______/______/_____                    Time:  ________ A.M.  P.M.       Patient Name:  ______________________________________________________  (please print)      Patient signature:  ___________________________________________________             Relationship to Patient:           []  Parent    Responsible person                          []  Spouse  In case of minor or                    [] Other  _____________   Incompetent name:  __________________________________________________                               (please print)      _____________      Responsible person  In case of minor or  Incompetent signature:  _______________________________________________    Statement of Physician  My signature below affirms that prior to the time of the procedure, I have explained to the patient and/or his/her guardian, the risks and benefits involved in the proposed treatment and any reasonable alternative to the proposed treatment.  I have also explained the risks and benefits involved in the refusal of the proposed treatment and have answered the patient's questions.                        Date:  ______/______/_______  Provider                      Signature:  __________________________________________________________       Time:  ___________ A.M    P.M.

## 2022-12-08 NOTE — PROGRESS NOTES
"Marylin Gay presents to [facility]      Chief Complaint  Hypertension, Hyperlipidemia      HPI     Respiratory:  SOA:  no. Exertional dyspnea: no.  Dyspnea at rest: no.  Flights of stairs climbable: 2.  Cardiology:  Chest pain: no.  Dizziness: no. Easily fatigued: no.  Pedal edema: no.  Light-headiness: no.  Symptoms of claudication: no.  Orthopnea: no.  Palpitations: no. Tachycardia: no.  Ophthalmology:  Last eye exam date: 2022 . Vision changes: none.  Gastroenterology:  Heartburn: no.  Nausea: no.    \"Can you increase my medicine? \"  Feeling over-whelmed.  Depressed, no thoughts to hurt herself. \"Crying for no reason.\" Irritable.  Some nights, sleeping well.  Other nights, not sleeping at all.  Spouse  2 yrs ago and niece  this past October    Current Outpatient Medications:   •  acetaminophen (TYLENOL) 325 MG tablet, Take 325 mg by mouth Every 4 (Four) Hours As Needed (INFLAMMATORY ARTHRITIS)., Disp: , Rfl:   •  amLODIPine (NORVASC) 5 MG tablet, Take 1 tablet by mouth Daily., Disp: 90 tablet, Rfl: 0  •  Ascorbic Acid (Vitamin C) 500 MG capsule, Take 1 capsule by mouth Daily., Disp: , Rfl:   •  Cranberry 500 MG capsule, Take 4 capsules by mouth Daily., Disp: , Rfl:   •  meloxicam (MOBIC) 15 MG tablet, Take 1 tablet by mouth Daily As Needed (ARTHRITIS)., Disp: 90 tablet, Rfl: 0  •  metoprolol succinate XL (TOPROL-XL) 100 MG 24 hr tablet, Take 1 tablet by mouth Daily., Disp: 90 tablet, Rfl: 0  •  omeprazole (priLOSEC) 20 MG capsule, Take 1 capsule by mouth 2 (Two) Times a Day., Disp: 90 capsule, Rfl: 0  •  prenatal vitamin (prenatal, CLASSIC, vitamin) tablet, Take 1 tablet by mouth Daily., Disp: , Rfl:   •  simvastatin (ZOCOR) 20 MG tablet, Take 1 tablet by mouth Every Night., Disp: 90 tablet, Rfl: 0  •  venlafaxine XR (Effexor XR) 75 MG 24 hr capsule, Take 1 capsule by mouth Daily. Take with Venlafaxine XR 37.5 mg daily, Disp: 90 capsule, Rfl: 0  •  venlafaxine XR " (Effexor XR) 37.5 MG 24 hr capsule, Take 1 capsule by mouth Daily. Take with Venlafaxine XR 75 mg, daily, Disp: 30 capsule, Rfl: 0     Allergies   Allergen Reactions   • Ertapenem Other (See Comments) and Delirium     VISION CHANGES (SEEING LIGHTS)   • Macrobid [Nitrofurantoin] Hives   • Methotrexate Other (See Comments)     TONGUE LESION    • Celebrex [Celecoxib] Other (See Comments)     ARTHRALGIA    • Ace Inhibitors Cough     (SIDE EFFECT)   • Mirabegron Rash        Past Medical History:   Diagnosis Date   • History of melanoma    • Insomnia    • Lumbar herniated disc     RIGHT SIDE - L5-S1       Past Surgical History:   Procedure Laterality Date   • APPENDECTOMY N/A    • BLADDER SUSPENSION     • CARPAL TUNNEL RELEASE Bilateral        • CATARACT EXTRACTION Bilateral    • CHOLECYSTECTOMY N/A    • COLONOSCOPY  2020    Negative   • ENDOMETRIAL ABLATION     • LAMINECTOMY AND MICRODISCECTOMY LUMBAR SPINE  2021    herniated disc   • REPLACEMENT TOTAL KNEE Right 2022   • TOTAL LAPAROSCOPIC HYSTERECTOMY          Family History   Problem Relation Age of Onset   • Diabetes Mother    • Heart failure Mother    • Ulcers Father    • Skin cancer Father    • Other Father         Heart issues   • No Known Problems Sister    • Arthritis Sister    • Alzheimer's disease Sister    • No Known Problems Sister    • Lung cancer Brother    • Other Brother         Heart issues   • Hypertension Brother    • Post-traumatic stress disorder Brother    • Other Brother         Heart issues   • Diabetes Brother    • No Known Problems Maternal Grandmother         Social History     Socioeconomic History   • Marital status: Single   Tobacco Use   • Smoking status: Former     Packs/day: 0.50     Years: 10.00     Pack years: 5.00     Types: Cigarettes     Start date:      Quit date:      Years since quittin.9     Passive exposure: Past   • Smokeless tobacco: Never   Vaping Use   • Vaping Use:  "Never used   Substance and Sexual Activity   • Alcohol use: Yes     Comment: Rare occasion   • Drug use: Never   • Sexual activity: Defer        ROS: As in HPI      Objective   Vital Signs:  /80 (BP Location: Right arm, Patient Position: Sitting, Cuff Size: Adult)   Pulse 75   Temp 98.2 °F (36.8 °C) (Temporal)   Resp 20   Ht 165.1 cm (65\")   Wt 69.8 kg (153 lb 12.8 oz)   SpO2 98%   BMI 25.59 kg/m²   Estimated body mass index is 25.59 kg/m² as calculated from the following:    Height as of this encounter: 165.1 cm (65\").    Weight as of this encounter: 69.8 kg (153 lb 12.8 oz).        Physical Exam  General:  No acute distress, cheerful, well kept, interactive, good energy level.  Cardiac:  Heart regular rate and rhythm without murmur.  Respiratory:  Lungs clear to auscultation bilaterally and excellent air movement throughout.  Pulses:                            Right          Left         Bruit  Carotid                2+              2+           No  Radial                 2+              2+  Abd Ao               Not bounding  Femoral             Not palpable bilaterally  DP                      2+              2+  PT                      2+              2+  Venous:  Absent JVD. Absent lower extremity varicosities.  Dermatology:  Non-dystrophic toe nails. No Integument changes of the toes, feet, ankles, shins.  Lower Extremities:  No leg edema.  Toes are warm and pink    Psychiatry  Attitude:   Cooperative  Psychomotor Activity:   Within normal range  Attention:  Excellent  Eye Contact: Good  Affect:   Appropriate  Mood:   Pleasant, relaxed  Speech:  Clear, articulate, normal speech pattern  Thought process:  Coherent and logical  Insight:   Good  Judgement:   Good      Result Review :                    Assessment and Plan   Diagnoses and all orders for this visit:    1. Benign hypertension (Primary)  Assessment & Plan:  MDM: Stable. Continue present medications without changes.      Orders:  -     " amLODIPine (NORVASC) 5 MG tablet; Take 1 tablet by mouth Daily.  Dispense: 90 tablet; Refill: 0  -     metoprolol succinate XL (TOPROL-XL) 100 MG 24 hr tablet; Take 1 tablet by mouth Daily.  Dispense: 90 tablet; Refill: 0    2. Inflammatory polyarthropathy (HCC)  Assessment & Plan:  MDM: Stable. Continue present medications without changes.    Orders:  -     meloxicam (MOBIC) 15 MG tablet; Take 1 tablet by mouth Daily As Needed (ARTHRITIS).  Dispense: 90 tablet; Refill: 0    3. Gastroesophageal reflux disease without esophagitis  Assessment & Plan:  MDM: Stable. Continue present medications without changes.    Orders:  -     omeprazole (priLOSEC) 20 MG capsule; Take 1 capsule by mouth 2 (Two) Times a Day.  Dispense: 90 capsule; Refill: 0    4. Major depressive disorder in remission, unspecified whether recurrent (HCC)  Assessment & Plan:  MDM: Patient's depression is not in control.  Slowly titrate Rx as indicated    Orders:  -     venlafaxine XR (Effexor XR) 75 MG 24 hr capsule; Take 1 capsule by mouth Daily. Take with Venlafaxine XR 37.5 mg daily  Dispense: 90 capsule; Refill: 0  -     venlafaxine XR (Effexor XR) 37.5 MG 24 hr capsule; Take 1 capsule by mouth Daily. Take with Venlafaxine XR 75 mg, daily  Dispense: 30 capsule; Refill: 0    5. Mixed hyperlipidemia  Assessment & Plan:  MDM: Stable. Continue present medications without changes.    Orders:  -     simvastatin (ZOCOR) 20 MG tablet; Take 1 tablet by mouth Every Night.  Dispense: 90 tablet; Refill: 0    6. Encounter for immunization  Comments:  MDM: Health prevention  Orders:  -     Fluzone High-Dose 65+yrs ([id number])           Follow Up   Return in about 3 weeks (around 12/29/2022) for F/U Anxiety/Depression.    Patient was given instructions and counseling regarding her condition or for health maintenance advice. Please see specific information pulled into the AVS if appropriate.   There are no Patient Instructions on file for this visit.   Florencia Bustamante  MD Yue

## 2023-01-10 ENCOUNTER — OFFICE VISIT (OUTPATIENT)
Dept: FAMILY MEDICINE CLINIC | Age: 73
End: 2023-01-10
Payer: OTHER GOVERNMENT

## 2023-01-10 VITALS
DIASTOLIC BLOOD PRESSURE: 86 MMHG | SYSTOLIC BLOOD PRESSURE: 138 MMHG | TEMPERATURE: 98.6 F | OXYGEN SATURATION: 98 % | HEIGHT: 65 IN | HEART RATE: 88 BPM | BODY MASS INDEX: 25.66 KG/M2 | WEIGHT: 154 LBS | RESPIRATION RATE: 16 BRPM

## 2023-01-10 DIAGNOSIS — F32.5 MAJOR DEPRESSIVE DISORDER IN REMISSION, UNSPECIFIED WHETHER RECURRENT: Primary | ICD-10-CM

## 2023-01-10 PROCEDURE — 99213 OFFICE O/P EST LOW 20 MIN: CPT | Performed by: FAMILY MEDICINE

## 2023-01-10 RX ORDER — VENLAFAXINE HYDROCHLORIDE 37.5 MG/1
37.5 CAPSULE, EXTENDED RELEASE ORAL DAILY
Qty: 90 CAPSULE | Refills: 0 | Status: SHIPPED | OUTPATIENT
Start: 2023-01-10

## 2023-01-10 RX ORDER — VENLAFAXINE HYDROCHLORIDE 75 MG/1
75 CAPSULE, EXTENDED RELEASE ORAL DAILY
Qty: 90 CAPSULE | Refills: 0 | Status: SHIPPED | OUTPATIENT
Start: 2023-01-10

## 2023-01-10 NOTE — PROGRESS NOTES
Marylin Gay presents to [facility]      Chief Complaint  Follow up mood disorder    HPI   No side effects from increased dose of Venlafaxine to 112.5 mg daily. \"Doing great.\" No longer feeling depressed.  No more mood swings.   No panic attacks.  Not feeling anxious. .  No thoughts to hurt self.  Not irritable.   No hallucinations.  Sleeping well.  Appetite is good.  Socializing.      Current Outpatient Medications:   •  acetaminophen (TYLENOL) 325 MG tablet, Take 325 mg by mouth Every 4 (Four) Hours As Needed (INFLAMMATORY ARTHRITIS)., Disp: , Rfl:   •  amLODIPine (NORVASC) 5 MG tablet, Take 1 tablet by mouth Daily., Disp: 90 tablet, Rfl: 0  •  Ascorbic Acid (Vitamin C) 500 MG capsule, Take 1 capsule by mouth Daily., Disp: , Rfl:   •  Cranberry 500 MG capsule, Take 4 capsules by mouth Daily., Disp: , Rfl:   •  meloxicam (MOBIC) 15 MG tablet, Take 1 tablet by mouth Daily As Needed (ARTHRITIS)., Disp: 90 tablet, Rfl: 0  •  metoprolol succinate XL (TOPROL-XL) 100 MG 24 hr tablet, Take 1 tablet by mouth Daily., Disp: 90 tablet, Rfl: 0  •  omeprazole (priLOSEC) 20 MG capsule, Take 1 capsule by mouth 2 (Two) Times a Day., Disp: 90 capsule, Rfl: 0  •  prenatal vitamin (prenatal, CLASSIC, vitamin) tablet, Take 1 tablet by mouth Daily., Disp: , Rfl:   •  simvastatin (ZOCOR) 20 MG tablet, Take 1 tablet by mouth Every Night., Disp: 90 tablet, Rfl: 0  •  venlafaxine XR (Effexor XR) 37.5 MG 24 hr capsule, Take 1 capsule by mouth Daily. Take with Venlafaxine XR 75 mg, daily, Disp: 90 capsule, Rfl: 0  •  venlafaxine XR (Effexor XR) 75 MG 24 hr capsule, Take 1 capsule by mouth Daily. Take with Venlafaxine XR 37.5 mg daily, Disp: 90 capsule, Rfl: 0     Allergies   Allergen Reactions   • Ertapenem Other (See Comments) and Delirium     VISION CHANGES (SEEING LIGHTS)   • Macrobid [Nitrofurantoin] Hives   • Methotrexate Other (See Comments)     TONGUE LESION    • Celebrex [Celecoxib] Other (See  Comments)     ARTHRALGIA    • Ace Inhibitors Cough     (SIDE EFFECT)   • Mirabegron Rash        Past Medical History:   Diagnosis Date   • History of melanoma    • Insomnia    • Lumbar herniated disc     RIGHT SIDE - L5-S1       Past Surgical History:   Procedure Laterality Date   • APPENDECTOMY N/A    • BLADDER SUSPENSION  2018   • CARPAL TUNNEL RELEASE Bilateral     2007   • CATARACT EXTRACTION Bilateral 2005   • CHOLECYSTECTOMY N/A    • COLONOSCOPY  2020    Negative   • ENDOMETRIAL ABLATION     • LAMINECTOMY AND MICRODISCECTOMY LUMBAR SPINE  2021    herniated disc   • REPLACEMENT TOTAL KNEE Right 2022   • TOTAL LAPAROSCOPIC HYSTERECTOMY          Family History   Problem Relation Age of Onset   • Diabetes Mother    • Heart failure Mother    • Ulcers Father    • Skin cancer Father    • Other Father         Heart issues   • No Known Problems Sister    • Arthritis Sister    • Alzheimer's disease Sister    • No Known Problems Sister    • Lung cancer Brother    • Other Brother         Heart issues   • Hypertension Brother    • Post-traumatic stress disorder Brother    • Other Brother         Heart issues   • Diabetes Brother    • No Known Problems Maternal Grandmother         Social History     Socioeconomic History   • Marital status: Single   Tobacco Use   • Smoking status: Former     Packs/day: 0.50     Years: 10.00     Pack years: 5.00     Types: Cigarettes     Start date:      Quit date:      Years since quittin.0     Passive exposure: Past   • Smokeless tobacco: Never   Vaping Use   • Vaping Use: Never used   Substance and Sexual Activity   • Alcohol use: Yes     Comment: Rare occasion   • Drug use: Never   • Sexual activity: Defer        ROS: As in HPI      Objective   Vital Signs:  /86 (BP Location: Left arm, Patient Position: Sitting, Cuff Size: Adult)   Pulse 88   Temp 98.6 °F (37 °C) (Temporal)   Resp 16   Ht 165.1 cm (65\")   Wt 69.9 kg (154 lb)   SpO2  98%   BMI 25.63 kg/m²   Estimated body mass index is 25.63 kg/m² as calculated from the following:    Height as of this encounter: 165.1 cm (65\").    Weight as of this encounter: 69.9 kg (154 lb).        Physical Exam  General Exam  Appearance:  Well developed, well nourished, well groomed  Neck:    No thyroidmegaly  Heart:   Regular rate and rhythm without murmur   Lungs:  Clear to auscultation, excellent air movement throughout  Neuro:   No tremor  Integument:  Warm, dry, pink without exanthema    Psychiatry  Attitude:   Cooperative  Psychomotor Activity:   Within normal range  Attention:  Excellent  Eye Contact: Good  Affect:   Appropriate  Mood:   Pleasant, relaxed  Speech:  Clear, articulate, normal speech pattern  Thought process:  Coherent and logical  Insight:   Good  Judgement:   Good  Aggression:   Absent      Result Review :                    Assessment and Plan   Diagnoses and all orders for this visit:    1. Major depressive disorder in remission, unspecified whether recurrent (HCC) (Primary)  Assessment & Plan:  MDM: Patient's depression is controlled. Continue 112.5 mg Effexor XR daily    Orders:  -     venlafaxine XR (Effexor XR) 37.5 MG 24 hr capsule; Take 1 capsule by mouth Daily. Take with Venlafaxine XR 75 mg, daily  Dispense: 90 capsule; Refill: 0  -     venlafaxine XR (Effexor XR) 75 MG 24 hr capsule; Take 1 capsule by mouth Daily. Take with Venlafaxine XR 37.5 mg daily  Dispense: 90 capsule; Refill: 0           Follow Up   Return in about 3 months (around 4/10/2023) for F/U Anxiety/Depression.    Patient was given instructions and counseling regarding her condition or for health maintenance advice. Please see specific information pulled into the AVS if appropriate.   There are no Patient Instructions on file for this visit.     Florencia Turner MD

## 2023-03-02 ENCOUNTER — TELEPHONE (OUTPATIENT)
Dept: FAMILY MEDICINE CLINIC | Age: 73
End: 2023-03-02
Payer: MEDICARE

## 2023-03-02 DIAGNOSIS — K21.9 GASTROESOPHAGEAL REFLUX DISEASE WITHOUT ESOPHAGITIS: ICD-10-CM

## 2023-03-02 RX ORDER — OMEPRAZOLE 20 MG/1
20 CAPSULE, DELAYED RELEASE ORAL 2 TIMES DAILY
Qty: 180 CAPSULE | Refills: 0 | Status: SHIPPED | OUTPATIENT
Start: 2023-03-02

## 2023-03-02 NOTE — TELEPHONE ENCOUNTER
Incoming Refill Request      Medication requested (name and dose):   omeprazole (priLOSEC) 20 MG capsule  20 mg, 2 Times Daily         Pharmacy where request should be sent: [facility] - [id number] [facility] - [phone]  - [phone]   [phone]    Additional details provided by patient: REQUESTED 90 DAY PRESCRIPTION     Best call back number: [phone]    Does the patient have less than a 3 day supply:  [x] Yes  [] No    Jia Link Rep  03/02/23, 12:59 EST

## 2023-04-11 ENCOUNTER — OFFICE VISIT (OUTPATIENT)
Dept: FAMILY MEDICINE CLINIC | Age: 73
End: 2023-04-11
Payer: MEDICARE

## 2023-04-11 VITALS
BODY MASS INDEX: 25.49 KG/M2 | OXYGEN SATURATION: 99 % | TEMPERATURE: 97.3 F | WEIGHT: 153 LBS | HEART RATE: 85 BPM | HEIGHT: 65 IN | SYSTOLIC BLOOD PRESSURE: 142 MMHG | DIASTOLIC BLOOD PRESSURE: 94 MMHG | RESPIRATION RATE: 16 BRPM

## 2023-04-11 DIAGNOSIS — K21.9 GASTROESOPHAGEAL REFLUX DISEASE WITHOUT ESOPHAGITIS: ICD-10-CM

## 2023-04-11 DIAGNOSIS — E78.2 MIXED HYPERLIPIDEMIA: ICD-10-CM

## 2023-04-11 DIAGNOSIS — I10 BENIGN HYPERTENSION: ICD-10-CM

## 2023-04-11 DIAGNOSIS — F32.5 MAJOR DEPRESSIVE DISORDER IN REMISSION, UNSPECIFIED WHETHER RECURRENT: Primary | ICD-10-CM

## 2023-04-11 PROCEDURE — 3080F DIAST BP >= 90 MM HG: CPT | Performed by: FAMILY MEDICINE

## 2023-04-11 PROCEDURE — 99213 OFFICE O/P EST LOW 20 MIN: CPT | Performed by: FAMILY MEDICINE

## 2023-04-11 PROCEDURE — 3077F SYST BP >= 140 MM HG: CPT | Performed by: FAMILY MEDICINE

## 2023-04-11 RX ORDER — AMLODIPINE BESYLATE 5 MG/1
5 TABLET ORAL DAILY
Qty: 90 TABLET | Refills: 0 | Status: SHIPPED | OUTPATIENT
Start: 2023-04-11

## 2023-04-11 RX ORDER — VENLAFAXINE HYDROCHLORIDE 37.5 MG/1
37.5 CAPSULE, EXTENDED RELEASE ORAL DAILY
Qty: 90 CAPSULE | Refills: 1 | Status: SHIPPED | OUTPATIENT
Start: 2023-04-11

## 2023-04-11 RX ORDER — SIMVASTATIN 20 MG
20 TABLET ORAL NIGHTLY
Qty: 90 TABLET | Refills: 1 | Status: SHIPPED | OUTPATIENT
Start: 2023-04-11

## 2023-04-11 RX ORDER — VENLAFAXINE HYDROCHLORIDE 75 MG/1
75 CAPSULE, EXTENDED RELEASE ORAL DAILY
Qty: 90 CAPSULE | Refills: 1 | Status: SHIPPED | OUTPATIENT
Start: 2023-04-11

## 2023-04-11 RX ORDER — METOPROLOL SUCCINATE 100 MG/1
100 TABLET, EXTENDED RELEASE ORAL DAILY
Qty: 90 TABLET | Refills: 0 | Status: SHIPPED | OUTPATIENT
Start: 2023-04-11

## 2023-04-11 NOTE — PROGRESS NOTES
Marylin Gay presents to [facility]      Chief Complaint  Follow up mood disorder  Ran out of Amlodipine and Metoprolol 2 wks ago  HPI   No side effects from Venlafaxine.  Very pleased with the medication and requesting to continue the medication. No panic attacks.  Not feeling anxious.  Not feeling sad, teary or depressed.  No thoughts to hurt self.  Not irritable.  No mood swings. No hallucinations.  Sleeping well.  Appetite is good.  Socializing.      Current Outpatient Medications:   •  acetaminophen (TYLENOL) 325 MG tablet, Take 1 tablet by mouth Every 4 (Four) Hours As Needed (INFLAMMATORY ARTHRITIS)., Disp: , Rfl:   •  amLODIPine (NORVASC) 5 MG tablet, Take 1 tablet by mouth Daily., Disp: 90 tablet, Rfl: 0  •  Ascorbic Acid (Vitamin C) 500 MG capsule, Take 1 capsule by mouth Daily., Disp: , Rfl:   •  Cranberry 500 MG capsule, Take 4 capsules by mouth Daily., Disp: , Rfl:   •  meloxicam (MOBIC) 15 MG tablet, Take 1 tablet by mouth Daily As Needed (ARTHRITIS)., Disp: 90 tablet, Rfl: 0  •  metoprolol succinate XL (TOPROL-XL) 100 MG 24 hr tablet, Take 1 tablet by mouth Daily., Disp: 90 tablet, Rfl: 0  •  omeprazole (priLOSEC) 20 MG capsule, Take 1 capsule by mouth 2 (Two) Times a Day., Disp: 180 capsule, Rfl: 0  •  prenatal vitamin (prenatal, CLASSIC, vitamin) tablet, Take 1 tablet by mouth Daily., Disp: , Rfl:   •  simvastatin (ZOCOR) 20 MG tablet, Take 1 tablet by mouth Every Night., Disp: 90 tablet, Rfl: 0  •  venlafaxine XR (Effexor XR) 37.5 MG 24 hr capsule, Take 1 capsule by mouth Daily. Take with Venlafaxine XR 75 mg, daily, Disp: 90 capsule, Rfl: 0  •  venlafaxine XR (Effexor XR) 75 MG 24 hr capsule, Take 1 capsule by mouth Daily. Take with Venlafaxine XR 37.5 mg daily, Disp: 90 capsule, Rfl: 0     Allergies   Allergen Reactions   • Ertapenem Other (See Comments) and Delirium     VISION CHANGES (SEEING LIGHTS)   • Macrobid [Nitrofurantoin] Hives   • Methotrexate Other (See  Comments)     TONGUE LESION    • Celebrex [Celecoxib] Other (See Comments)     ARTHRALGIA    • Ace Inhibitors Cough     (SIDE EFFECT)   • Mirabegron Rash        Past Medical History:   Diagnosis Date   • History of melanoma    • Insomnia    • Lumbar herniated disc     RIGHT SIDE - L5-S1       Past Surgical History:   Procedure Laterality Date   • APPENDECTOMY N/A    • BLADDER SUSPENSION  2018   • CARPAL TUNNEL RELEASE Bilateral     2007   • CATARACT EXTRACTION Bilateral    • CHOLECYSTECTOMY N/A    • COLONOSCOPY  2020    Negative   • ENDOMETRIAL ABLATION     • LAMINECTOMY AND MICRODISCECTOMY LUMBAR SPINE  2021    herniated disc   • REPLACEMENT TOTAL KNEE Right 2022   • TOTAL LAPAROSCOPIC HYSTERECTOMY          Family History   Problem Relation Age of Onset   • Diabetes Mother    • Heart failure Mother    • Ulcers Father    • Skin cancer Father    • Other Father         Heart issues   • No Known Problems Sister    • Arthritis Sister    • Alzheimer's disease Sister    • No Known Problems Sister    • Lung cancer Brother    • Other Brother         Heart issues   • Hypertension Brother    • Post-traumatic stress disorder Brother    • Other Brother         Heart issues   • Diabetes Brother    • No Known Problems Maternal Grandmother         Social History     Socioeconomic History   • Marital status: Single   Tobacco Use   • Smoking status: Former     Packs/day: 0.50     Years: 10.00     Pack years: 5.00     Types: Cigarettes     Start date:      Quit date:      Years since quittin.3     Passive exposure: Past   • Smokeless tobacco: Never   Vaping Use   • Vaping Use: Never used   Substance and Sexual Activity   • Alcohol use: Yes     Comment: Rare occasion   • Drug use: Never   • Sexual activity: Defer        ROS: As in HPI      Objective   Vital Signs:  /94 (BP Location: Left arm, Patient Position: Sitting, Cuff Size: Adult) Comment: Pt has been out of b/p meds for 2  "weeks  Pulse 85   Temp 97.3 °F (36.3 °C) (Temporal)   Resp 16   Ht 165.1 cm (65\")   Wt 69.4 kg (153 lb)   SpO2 99%   BMI 25.46 kg/m²   Estimated body mass index is 25.46 kg/m² as calculated from the following:    Height as of this encounter: 165.1 cm (65\").    Weight as of this encounter: 69.4 kg (153 lb).        Physical Exam  General Exam  Appearance:  Well developed, well nourished, well groomed  Neck:    No thyroidmegaly  Heart:   Regular rate and rhythm without murmur   Lungs:  Clear to auscultation, excellent air movement throughout  Neuro:   No tremor  Integument:  Warm, dry, pink without exanthema    Psychiatry  Attitude:   Cooperative  Psychomotor Activity:   Within normal range  Attention:  Excellent  Eye Contact: Good  Affect:   Appropriate  Mood:   Pleasant, relaxed  Speech:  Clear, articulate, normal speech pattern  Thought process:  Coherent and logical  Insight:   Good  Judgement:   Good  Aggression:   Absent      Result Review :                    Assessment and Plan   Diagnoses and all orders for this visit:    1. Major depressive disorder in remission, unspecified whether recurrent (Primary)  Assessment & Plan:  MDM: Stable. Continue present medications without changes.      2. Benign hypertension  Assessment & Plan:  MDM: Stable. Continue present medications without changes.        3. Mixed hyperlipidemia  Assessment & Plan:  MDM: Stable. Continue present medications without changes.      4. Gastroesophageal reflux disease without esophagitis  Assessment & Plan:  MDM: Stable. Continue present medications without changes.             Follow Up   No follow-ups on file.    Patient was given instructions and counseling regarding her condition or for health maintenance advice. Please see specific information pulled into the AVS if appropriate.   There are no Patient Instructions on file for this visit.     Florencia Turner MD  "

## 2023-04-20 ENCOUNTER — OFFICE VISIT (OUTPATIENT)
Dept: FAMILY MEDICINE CLINIC | Age: 73
End: 2023-04-20
Payer: MEDICARE

## 2023-04-20 VITALS
TEMPERATURE: 98.2 F | DIASTOLIC BLOOD PRESSURE: 87 MMHG | HEIGHT: 65 IN | HEART RATE: 85 BPM | WEIGHT: 151.8 LBS | SYSTOLIC BLOOD PRESSURE: 153 MMHG | RESPIRATION RATE: 16 BRPM | BODY MASS INDEX: 25.29 KG/M2 | OXYGEN SATURATION: 98 %

## 2023-04-20 DIAGNOSIS — Z12.31 ENCOUNTER FOR SCREENING MAMMOGRAM FOR MALIGNANT NEOPLASM OF BREAST: ICD-10-CM

## 2023-04-20 DIAGNOSIS — Z00.00 ROUTINE GENERAL MEDICAL EXAMINATION AT A HEALTH CARE FACILITY: Primary | ICD-10-CM

## 2023-04-20 NOTE — PROGRESS NOTES
Marylin Gay presents to [facility]IMARY [facility]      Chief Complaint  Adult Wellness    HPI   Doing well.  Mail order has not sent Amlodipine.  Out of the medication x 2 wks      Current Outpatient Medications:   •  acetaminophen (TYLENOL) 325 MG tablet, Take 1 tablet by mouth Every 4 (Four) Hours As Needed (INFLAMMATORY ARTHRITIS)., Disp: , Rfl:   •  amLODIPine (NORVASC) 5 MG tablet, Take 1 tablet by mouth Daily., Disp: 90 tablet, Rfl: 0  •  Ascorbic Acid (Vitamin C) 500 MG capsule, Take 1 capsule by mouth Daily., Disp: , Rfl:   •  Cranberry 500 MG capsule, Take 4 capsules by mouth Daily., Disp: , Rfl:   •  meloxicam (MOBIC) 15 MG tablet, Take 1 tablet by mouth Daily As Needed (ARTHRITIS)., Disp: 90 tablet, Rfl: 0  •  metoprolol succinate XL (TOPROL-XL) 100 MG 24 hr tablet, Take 1 tablet by mouth Daily., Disp: 90 tablet, Rfl: 0  •  omeprazole (priLOSEC) 20 MG capsule, Take 1 capsule by mouth 2 (Two) Times a Day., Disp: 180 capsule, Rfl: 0  •  prenatal vitamin (prenatal, CLASSIC, vitamin) tablet, Take 1 tablet by mouth Daily., Disp: , Rfl:   •  simvastatin (ZOCOR) 20 MG tablet, Take 1 tablet by mouth Every Night., Disp: 90 tablet, Rfl: 1  •  venlafaxine XR (Effexor XR) 37.5 MG 24 hr capsule, Take 1 capsule by mouth Daily. Take with Venlafaxine XR 75 mg, daily, Disp: 90 capsule, Rfl: 1  •  venlafaxine XR (Effexor XR) 75 MG 24 hr capsule, Take 1 capsule by mouth Daily. Take with Venlafaxine XR 37.5 mg daily, Disp: 90 capsule, Rfl: 1     Allergies   Allergen Reactions   • Ertapenem Other (See Comments) and Delirium     VISION CHANGES (SEEING LIGHTS)   • Macrobid [Nitrofurantoin] Hives   • Methotrexate Other (See Comments)     TONGUE LESION    • Celebrex [Celecoxib] Other (See Comments)     ARTHRALGIA    • Ace Inhibitors Cough     (SIDE EFFECT)   • Mirabegron Rash        Past Medical History:   Diagnosis Date   • History of melanoma    • Insomnia 2005   • Lumbar herniated disc     RIGHT SIDE - L5-S1  "      Past Surgical History:   Procedure Laterality Date   • APPENDECTOMY N/A    • BLADDER SUSPENSION  2018   • CARPAL TUNNEL RELEASE Bilateral     2007   • CATARACT EXTRACTION Bilateral    • CHOLECYSTECTOMY N/A    • COLONOSCOPY  2020    Negative   • ENDOMETRIAL ABLATION     • LAMINECTOMY AND MICRODISCECTOMY LUMBAR SPINE  2021    herniated disc   • REPLACEMENT TOTAL KNEE Right 2022   • TOTAL LAPAROSCOPIC HYSTERECTOMY          Family History   Problem Relation Age of Onset   • Diabetes Mother    • Heart failure Mother    • Ulcers Father    • Skin cancer Father    • Other Father         Heart issues   • No Known Problems Sister    • Arthritis Sister    • Alzheimer's disease Sister    • No Known Problems Sister    • Lung cancer Brother    • Other Brother         Heart issues   • Hypertension Brother    • Post-traumatic stress disorder Brother    • Other Brother         Heart issues   • Diabetes Brother    • No Known Problems Maternal Grandmother         Social History     Socioeconomic History   • Marital status: Single   Tobacco Use   • Smoking status: Former     Packs/day: 0.50     Years: 10.00     Pack years: 5.00     Types: Cigarettes     Start date:      Quit date:      Years since quittin.3     Passive exposure: Past   • Smokeless tobacco: Never   Vaping Use   • Vaping Use: Never used   Substance and Sexual Activity   • Alcohol use: Yes     Comment: Rare occasion   • Drug use: Never   • Sexual activity: Defer              Objective   Vital Signs:  /87 (BP Location: Left arm, Patient Position: Sitting, Cuff Size: Adult) Comment: Has not had Amlodipine x 2 weeks now due to not receiving it from mail order pharmacy yet.  Pulse 85   Temp 98.2 °F (36.8 °C) (Infrared)   Resp 16   Ht 165.1 cm (65\")   Wt 68.9 kg (151 lb 12.8 oz)   SpO2 98%   BMI 25.26 kg/m²   Estimated body mass index is 25.26 kg/m² as calculated from the following:    Height as of this encounter: " "165.1 cm (65\").    Weight as of this encounter: 68.9 kg (151 lb 12.8 oz).      Physical Exam  General Appearance:  No acute distress.  Well kept. Good energy level.  Integument:   Warm, dry, pink without exanthema.  Eye        PERRLA, EOMI, no conjunctival palpebrae pallor.  Ears:     Normal auditory canals and tympanic membranes.  Nose:    Breathing comfortably through the nose, 2+ edema, normal nasal mucosa,  no mucous  Sinuses:   Non-tender.  Oral cavity:   No oral lesions, no dental caries.   Throat:   No erythema or exudates.  Normal A:P diameter and width. Voice slightly hoarse (\"It gets that way when I work in my garden\")  Neck:      Minimal symmetric anterior superior cervical lymph node enlargement.  No thyroidmegaly, nodules or  asymmetry.  Heart:     Regular rate and rhythm without murmur.  Lungs:   Clear to auscultation, excellent air movement.  Abdomen:  Normal bowel sounds.  Abdomen soft and non-tender.  No organomegaly.  Extremities/Back:   No scoliosis.  Proper alignment of the lower extremities.  Neurologic:   CN II-XII intact.  Normal cognitive function. Normal speech pattern, clear  and articulate.  No tremor.  DTR 2+, Romberg negative.  Normal  gait.  Psych: Mood is pleasant and relaxed.    Result Review :                   Assessment and Plan   Diagnoses and all orders for this visit:    1. Routine general medical examination at a health care facility (Primary)  Comments:  MDM: Health maintenance    2. Encounter for screening mammogram for malignant neoplasm of breast  Comments:  MDM: Health maintenance  Orders:  -     Mammo Screening Bilateral With CAD; Future             Follow Up   Return in about 3 months (around 7/20/2023) for Cor.    Patient was given instructions and counseling regarding her condition or for health maintenance advice. Please see specific information pulled into the AVS if appropriate.       "

## 2023-07-26 ENCOUNTER — OFFICE VISIT (OUTPATIENT)
Dept: FAMILY MEDICINE CLINIC | Age: 73
End: 2023-07-26
Payer: MEDICARE

## 2023-07-26 VITALS
HEIGHT: 65 IN | HEART RATE: 69 BPM | WEIGHT: 154.8 LBS | BODY MASS INDEX: 25.79 KG/M2 | RESPIRATION RATE: 16 BRPM | OXYGEN SATURATION: 98 % | SYSTOLIC BLOOD PRESSURE: 128 MMHG | DIASTOLIC BLOOD PRESSURE: 72 MMHG | TEMPERATURE: 98.2 F

## 2023-07-26 DIAGNOSIS — M06.4 INFLAMMATORY POLYARTHROPATHY: ICD-10-CM

## 2023-07-26 DIAGNOSIS — I10 BENIGN HYPERTENSION: ICD-10-CM

## 2023-07-26 DIAGNOSIS — F32.5 MAJOR DEPRESSIVE DISORDER IN REMISSION, UNSPECIFIED WHETHER RECURRENT: ICD-10-CM

## 2023-07-26 DIAGNOSIS — K21.9 GASTROESOPHAGEAL REFLUX DISEASE WITHOUT ESOPHAGITIS: ICD-10-CM

## 2023-07-26 PROCEDURE — 3074F SYST BP LT 130 MM HG: CPT | Performed by: FAMILY MEDICINE

## 2023-07-26 PROCEDURE — 1159F MED LIST DOCD IN RCRD: CPT | Performed by: FAMILY MEDICINE

## 2023-07-26 PROCEDURE — 3078F DIAST BP <80 MM HG: CPT | Performed by: FAMILY MEDICINE

## 2023-07-26 PROCEDURE — 1160F RVW MEDS BY RX/DR IN RCRD: CPT | Performed by: FAMILY MEDICINE

## 2023-07-26 PROCEDURE — 99214 OFFICE O/P EST MOD 30 MIN: CPT | Performed by: FAMILY MEDICINE

## 2023-07-26 RX ORDER — VENLAFAXINE HYDROCHLORIDE 75 MG/1
75 CAPSULE, EXTENDED RELEASE ORAL DAILY
Qty: 90 CAPSULE | Refills: 1 | Status: SHIPPED | OUTPATIENT
Start: 2023-07-26

## 2023-07-26 RX ORDER — VENLAFAXINE HYDROCHLORIDE 37.5 MG/1
37.5 CAPSULE, EXTENDED RELEASE ORAL DAILY
Qty: 90 CAPSULE | Refills: 1 | Status: SHIPPED | OUTPATIENT
Start: 2023-07-26

## 2023-07-26 RX ORDER — MELOXICAM 15 MG/1
15 TABLET ORAL DAILY PRN
Qty: 90 TABLET | Refills: 0 | Status: SHIPPED | OUTPATIENT
Start: 2023-07-26

## 2023-07-26 RX ORDER — METOPROLOL SUCCINATE 100 MG/1
100 TABLET, EXTENDED RELEASE ORAL DAILY
Qty: 90 TABLET | Refills: 0 | Status: SHIPPED | OUTPATIENT
Start: 2023-07-26

## 2023-07-26 RX ORDER — AMLODIPINE BESYLATE 5 MG/1
5 TABLET ORAL DAILY
Qty: 90 TABLET | Refills: 0 | Status: SHIPPED | OUTPATIENT
Start: 2023-07-26

## 2023-07-26 RX ORDER — OMEPRAZOLE 20 MG/1
20 CAPSULE, DELAYED RELEASE ORAL 2 TIMES DAILY
Qty: 180 CAPSULE | Refills: 0 | Status: SHIPPED | OUTPATIENT
Start: 2023-07-26

## 2023-07-26 NOTE — PROGRESS NOTES
Marylin Gay presents to [facility]      Chief Complaint  Hypertension      HPI     Respiratory:  SOA:  no. Exertional dyspnea: no.  Dyspnea at rest: no.  Flights of stairs climbable: 4.  Cardiology:  Chest pain: no.  Dizziness: no. Easily fatigued: no.  Pedal edema: no.  Light-headiness: no.  Symptoms of claudication: no.  Orthopnea: no.  Palpitations: no. Tachycardia: no.  Ophthalmology:  Last eye exam date: 5/23 . Vision changes: none.  Gastroenterology:  Heartburn: Intermittent, worse post consuming chili.  Nausea: no.    ROS: Taking Meloxicam every 3-4 days for small joint pain and stiffness in hands      Current Outpatient Medications:     acetaminophen (TYLENOL) 325 MG tablet, Take 1 tablet by mouth Every 4 (Four) Hours As Needed (INFLAMMATORY ARTHRITIS)., Disp: , Rfl:     amLODIPine (NORVASC) 5 MG tablet, Take 1 tablet by mouth Daily., Disp: 90 tablet, Rfl: 0    Ascorbic Acid (Vitamin C) 500 MG capsule, Take 1 capsule by mouth Daily., Disp: , Rfl:     Cranberry 500 MG capsule, Take 4 capsules by mouth Daily., Disp: , Rfl:     meloxicam (MOBIC) 15 MG tablet, Take 1 tablet by mouth Daily As Needed (ARTHRITIS)., Disp: 90 tablet, Rfl: 0    metoprolol succinate XL (TOPROL-XL) 100 MG 24 hr tablet, Take 1 tablet by mouth Daily., Disp: 90 tablet, Rfl: 0    omeprazole (priLOSEC) 20 MG capsule, Take 1 capsule by mouth 2 (Two) Times a Day., Disp: 180 capsule, Rfl: 0    prenatal vitamin (prenatal, CLASSIC, vitamin) tablet, Take 1 tablet by mouth Daily., Disp: , Rfl:     simvastatin (ZOCOR) 20 MG tablet, Take 1 tablet by mouth Every Night., Disp: 90 tablet, Rfl: 1    venlafaxine XR (Effexor XR) 37.5 MG 24 hr capsule, Take 1 capsule by mouth Daily. Take with Venlafaxine XR 75 mg, daily, Disp: 90 capsule, Rfl: 1    venlafaxine XR (Effexor XR) 75 MG 24 hr capsule, Take 1 capsule by mouth Daily. Take with Venlafaxine XR 37.5 mg daily, Disp: 90 capsule, Rfl: 1     Allergies   Allergen Reactions     Ertapenem Other (See Comments) and Delirium     VISION CHANGES (SEEING LIGHTS)    Macrobid [Nitrofurantoin] Hives    Methotrexate Other (See Comments)     TONGUE LESION     Celebrex [Celecoxib] Other (See Comments)     ARTHRALGIA     Ace Inhibitors Cough     (SIDE EFFECT)    Mirabegron Rash        Past Medical History:   Diagnosis Date    History of melanoma     Hyperlipidemia     Hypertension     Insomnia 2005    Lumbar herniated disc     RIGHT SIDE - L5-S1       Past Surgical History:   Procedure Laterality Date    APPENDECTOMY N/A     BASAL CELL CARCINOMA EXCISION Right 2023    Right shoulder    BLADDER SUSPENSION  2018    CARPAL TUNNEL RELEASE Bilateral     2007    CATARACT EXTRACTION Bilateral     CHOLECYSTECTOMY N/A     COLONOSCOPY  2020    Negative    ENDOMETRIAL ABLATION      LAMINECTOMY AND MICRODISCECTOMY LUMBAR SPINE  2021    herniated disc    REPLACEMENT TOTAL KNEE Right 2022    TOTAL LAPAROSCOPIC HYSTERECTOMY          Family History   Problem Relation Age of Onset    Diabetes Mother     Heart failure Mother     Ulcers Father     Skin cancer Father     Other Father         Heart issues    No Known Problems Sister     Arthritis Sister     Alzheimer's disease Sister     No Known Problems Sister     Lung cancer Brother     Other Brother         Heart issues    Hypertension Brother     Post-traumatic stress disorder Brother     Other Brother         Heart issues    Diabetes Brother     No Known Problems Maternal Grandmother         Social History     Socioeconomic History    Marital status: Single   Tobacco Use    Smoking status: Former     Packs/day: 0.50     Years: 10.00     Pack years: 5.00     Types: Cigarettes     Start date:      Quit date:      Years since quittin.5     Passive exposure: Past    Smokeless tobacco: Never   Vaping Use    Vaping Use: Never used   Substance and Sexual Activity    Alcohol use: Yes     Comment: Rare occasion    Drug use:  "Never    Sexual activity: Defer            Objective   Vital Signs:  /72 (BP Location: Left arm, Patient Position: Sitting, Cuff Size: Adult)   Pulse 69   Temp 98.2 °F (36.8 °C) (Infrared)   Resp 16   Ht 165.1 cm (65\")   Wt 70.2 kg (154 lb 12.8 oz)   SpO2 98%   BMI 25.76 kg/m²   Estimated body mass index is 25.76 kg/m² as calculated from the following:    Height as of this encounter: 165.1 cm (65\").    Weight as of this encounter: 70.2 kg (154 lb 12.8 oz).        Physical Exam  General:  No acute distress, cheerful, well kept, interactive, good energy level.  Cardiac:  Heart regular rate and rhythm without murmur.  Respiratory:  Lungs clear to auscultation bilaterally and excellent air movement throughout.  Pulses:                            Right          Left         Bruit  Carotid                2+              2+           No  Radial                 2+              2+  Abd Ao               Not bounding  Femoral             Not palpable bilaterally  DP                      1+              2+  PT                      2+              2+  Venous:  Absent JVD. Absent lower extremity varicosities.  Dermatology:  Non-dystrophic toe nails. No Integument changes of the toes, feet, ankles, shins.  Lower Extremities:  No leg edema.  Toes are warm and pink    Result Review :                    Assessment and Plan   Diagnoses and all orders for this visit:    1. Benign hypertension  Assessment & Plan:  MDM: Stable. Continue present medications without changes.     Orders:  -     metoprolol succinate XL (TOPROL-XL) 100 MG 24 hr tablet; Take 1 tablet by mouth Daily.  Dispense: 90 tablet; Refill: 0  -     amLODIPine (NORVASC) 5 MG tablet; Take 1 tablet by mouth Daily.  Dispense: 90 tablet; Refill: 0    2. Inflammatory polyarthropathy  -     meloxicam (MOBIC) 15 MG tablet; Take 1 tablet by mouth Daily As Needed (ARTHRITIS).  Dispense: 90 tablet; Refill: 0    3. Gastroesophageal reflux disease without esophagitis  -  "    omeprazole (priLOSEC) 20 MG capsule; Take 1 capsule by mouth 2 (Two) Times a Day.  Dispense: 180 capsule; Refill: 0    4. Major depressive disorder in remission, unspecified whether recurrent  Assessment & Plan:  MDM: Stable. Continue present medications without changes.     Orders:  -     venlafaxine XR (Effexor XR) 37.5 MG 24 hr capsule; Take 1 capsule by mouth Daily. Take with Venlafaxine XR 75 mg, daily  Dispense: 90 capsule; Refill: 1  -     venlafaxine XR (Effexor XR) 75 MG 24 hr capsule; Take 1 capsule by mouth Daily. Take with Venlafaxine XR 37.5 mg daily  Dispense: 90 capsule; Refill: 1             Follow Up   Return in about 3 months (around 10/26/2023) for Cor, Fasting labs.    Patient was given instructions and counseling regarding her condition or for health maintenance advice. Please see specific information pulled into the AVS if appropriate.     Florencia Turner MD

## 2023-10-25 ENCOUNTER — OFFICE VISIT (OUTPATIENT)
Dept: FAMILY MEDICINE CLINIC | Facility: CLINIC | Age: 73
End: 2023-10-25
Payer: MEDICARE

## 2023-10-25 VITALS
DIASTOLIC BLOOD PRESSURE: 82 MMHG | HEIGHT: 65 IN | WEIGHT: 152.6 LBS | OXYGEN SATURATION: 98 % | BODY MASS INDEX: 25.43 KG/M2 | HEART RATE: 70 BPM | TEMPERATURE: 98.7 F | RESPIRATION RATE: 16 BRPM | SYSTOLIC BLOOD PRESSURE: 148 MMHG

## 2023-10-25 DIAGNOSIS — I10 BENIGN HYPERTENSION: Primary | ICD-10-CM

## 2023-10-25 DIAGNOSIS — E78.2 MIXED HYPERLIPIDEMIA: ICD-10-CM

## 2023-10-25 DIAGNOSIS — F32.5 MAJOR DEPRESSIVE DISORDER IN REMISSION, UNSPECIFIED WHETHER RECURRENT: ICD-10-CM

## 2023-10-25 DIAGNOSIS — Z23 ENCOUNTER FOR IMMUNIZATION: ICD-10-CM

## 2023-10-25 DIAGNOSIS — K21.9 GASTROESOPHAGEAL REFLUX DISEASE WITHOUT ESOPHAGITIS: ICD-10-CM

## 2023-10-25 DIAGNOSIS — N39.0 URINARY TRACT INFECTION WITHOUT HEMATURIA, SITE UNSPECIFIED: ICD-10-CM

## 2023-10-25 DIAGNOSIS — M06.4 INFLAMMATORY POLYARTHROPATHY: ICD-10-CM

## 2023-10-25 DIAGNOSIS — R82.90 MALODOROUS URINE: ICD-10-CM

## 2023-10-25 LAB
BILIRUB BLD-MCNC: ABNORMAL MG/DL
CLARITY, POC: ABNORMAL
COLOR UR: ABNORMAL
EXPIRATION DATE: ABNORMAL
GLUCOSE UR STRIP-MCNC: NEGATIVE MG/DL
KETONES UR QL: NEGATIVE
LEUKOCYTE EST, POC: ABNORMAL
Lab: ABNORMAL
NITRITE UR-MCNC: POSITIVE MG/ML
PH UR: 6 [PH] (ref 5–8)
PROT UR STRIP-MCNC: ABNORMAL MG/DL
RBC # UR STRIP: ABNORMAL /UL
SP GR UR: 1.03 (ref 1–1.03)
UROBILINOGEN UR QL: NORMAL

## 2023-10-25 PROCEDURE — 83735 ASSAY OF MAGNESIUM: CPT | Performed by: FAMILY MEDICINE

## 2023-10-25 PROCEDURE — 87077 CULTURE AEROBIC IDENTIFY: CPT | Performed by: FAMILY MEDICINE

## 2023-10-25 PROCEDURE — 80053 COMPREHEN METABOLIC PANEL: CPT | Performed by: FAMILY MEDICINE

## 2023-10-25 PROCEDURE — 87186 SC STD MICRODIL/AGAR DIL: CPT | Performed by: FAMILY MEDICINE

## 2023-10-25 PROCEDURE — 80061 LIPID PANEL: CPT | Performed by: FAMILY MEDICINE

## 2023-10-25 PROCEDURE — 84443 ASSAY THYROID STIM HORMONE: CPT | Performed by: FAMILY MEDICINE

## 2023-10-25 PROCEDURE — 82607 VITAMIN B-12: CPT | Performed by: FAMILY MEDICINE

## 2023-10-25 PROCEDURE — 87086 URINE CULTURE/COLONY COUNT: CPT | Performed by: FAMILY MEDICINE

## 2023-10-25 RX ORDER — MELOXICAM 15 MG/1
15 TABLET ORAL DAILY PRN
Qty: 90 TABLET | Refills: 0 | Status: SHIPPED | OUTPATIENT
Start: 2023-10-25

## 2023-10-25 RX ORDER — METOPROLOL SUCCINATE 100 MG/1
100 TABLET, EXTENDED RELEASE ORAL DAILY
Qty: 90 TABLET | Refills: 0 | Status: SHIPPED | OUTPATIENT
Start: 2023-10-25

## 2023-10-25 RX ORDER — VENLAFAXINE HYDROCHLORIDE 75 MG/1
75 CAPSULE, EXTENDED RELEASE ORAL DAILY
Qty: 90 CAPSULE | Refills: 1 | Status: SHIPPED | OUTPATIENT
Start: 2023-10-25

## 2023-10-25 RX ORDER — CIPROFLOXACIN 500 MG/1
500 TABLET, FILM COATED ORAL 2 TIMES DAILY
Qty: 6 TABLET | Refills: 0 | Status: SHIPPED | OUTPATIENT
Start: 2023-10-25 | End: 2023-10-27 | Stop reason: SDUPTHER

## 2023-10-25 RX ORDER — AMLODIPINE BESYLATE 5 MG/1
5 TABLET ORAL DAILY
Qty: 90 TABLET | Refills: 0 | Status: SHIPPED | OUTPATIENT
Start: 2023-10-25

## 2023-10-25 RX ORDER — VENLAFAXINE HYDROCHLORIDE 37.5 MG/1
37.5 CAPSULE, EXTENDED RELEASE ORAL DAILY
Qty: 90 CAPSULE | Refills: 1 | Status: SHIPPED | OUTPATIENT
Start: 2023-10-25

## 2023-10-25 RX ORDER — SIMVASTATIN 20 MG
20 TABLET ORAL NIGHTLY
Qty: 90 TABLET | Refills: 1 | Status: SHIPPED | OUTPATIENT
Start: 2023-10-25

## 2023-10-25 RX ORDER — OMEPRAZOLE 20 MG/1
20 CAPSULE, DELAYED RELEASE ORAL 2 TIMES DAILY
Qty: 180 CAPSULE | Refills: 0 | Status: CANCELLED | OUTPATIENT
Start: 2023-10-25

## 2023-10-25 NOTE — PROGRESS NOTES
Venipuncture Blood Specimen Collection  Venipuncture performed in R ARM by Yanelis Flores MA with good hemostasis. Patient tolerated the procedure well without complications.   10/25/23   Yanelis Flores MA

## 2023-10-25 NOTE — ASSESSMENT & PLAN NOTE
MDM: Stable. Continue present medications without changes. Mild elevation in systolic BP possibly secondary to UTI, and or use of NSAID.  Continue to monitor

## 2023-10-25 NOTE — ASSESSMENT & PLAN NOTE
MDM:  Patient states take PPI daily, again, due to cold weather.  Patient counseled on side effects of PPI, including ill effects on the heart.  Take only when needed.  Take with food

## 2023-10-25 NOTE — PROGRESS NOTES
Marylin Gay presents to [facility]      Chief Complaint  Hypertension  Malodorous urine x 4 day    HPI (Fasting).    Respiratory:  SOA:  no. Exertional dyspnea: no.  Dyspnea at rest: no.  Flights of stairs climbable: 4.  Cardiology:  Chest pain: no.  Dizziness: no. Easily fatigued: no.  Pedal edema: no.  Light-headiness: no.  Symptoms of claudication: no.  Orthopnea: no.  Palpitations: no. Tachycardia: no.  Ophthalmology:  Last eye exam date: 5/2023 . Vision changes: none.  Gastroenterology:  Heartburn: no.  Nausea: no.    Increased Urinary Frequency:  slightly.  Increased Nocturia:  slightly.  Urgency:  No.  Incontinence:  No.  Post Void Pressure:  No.  Dysuria:  No.    Malodorous Urine:  yes.  Hematuria:  No.  Fever:  No.   Chills:  No.  Low Pelvic Pain:  No.  Right Flank Pain:  No.  Left Flank Pain:  No.       Current Outpatient Medications:     acetaminophen (TYLENOL) 325 MG tablet, Take 1 tablet by mouth Every 4 (Four) Hours As Needed (INFLAMMATORY ARTHRITIS)., Disp: , Rfl:     amLODIPine (NORVASC) 5 MG tablet, Take 1 tablet by mouth Daily., Disp: 90 tablet, Rfl: 0    Ascorbic Acid (Vitamin C) 500 MG capsule, Take 1 capsule by mouth Daily., Disp: , Rfl:     Cranberry 500 MG capsule, Take 4 capsules by mouth Daily., Disp: , Rfl:     meloxicam (MOBIC) 15 MG tablet, Take 1 tablet by mouth Daily As Needed (ARTHRITIS)., Disp: 90 tablet, Rfl: 0    metoprolol succinate XL (TOPROL-XL) 100 MG 24 hr tablet, Take 1 tablet by mouth Daily., Disp: 90 tablet, Rfl: 0    omeprazole (priLOSEC) 20 MG capsule, Take 1 capsule by mouth 2 (Two) Times a Day., Disp: 180 capsule, Rfl: 0    prenatal vitamin (prenatal, CLASSIC, vitamin) tablet, Take 1 tablet by mouth Daily., Disp: , Rfl:     simvastatin (ZOCOR) 20 MG tablet, Take 1 tablet by mouth Every Night., Disp: 90 tablet, Rfl: 1    venlafaxine XR (Effexor XR) 37.5 MG 24 hr capsule, Take 1 capsule by mouth Daily. Take with Venlafaxine XR 75 mg, daily,  Disp: 90 capsule, Rfl: 1    venlafaxine XR (Effexor XR) 75 MG 24 hr capsule, Take 1 capsule by mouth Daily. Take with Venlafaxine XR 37.5 mg daily, Disp: 90 capsule, Rfl: 1    ciprofloxacin (Cipro) 500 MG tablet, Take 1 tablet by mouth 2 (Two) Times a Day., Disp: 6 tablet, Rfl: 0     Allergies   Allergen Reactions    Ertapenem Other (See Comments) and Delirium     VISION CHANGES (SEEING LIGHTS)    Macrobid [Nitrofurantoin] Hives    Methotrexate Other (See Comments)     TONGUE LESION     Celebrex [Celecoxib] Other (See Comments)     ARTHRALGIA     Ace Inhibitors Cough     (SIDE EFFECT)    Mirabegron Rash        Past Medical History:   Diagnosis Date    History of melanoma     Insomnia 2005    Lumbar herniated disc     RIGHT SIDE - L5-S1       Past Surgical History:   Procedure Laterality Date    APPENDECTOMY N/A 2001    BASAL CELL CARCINOMA EXCISION Right 07/18/2023    Right shoulder    BLADDER SUSPENSION  2018    CARPAL TUNNEL RELEASE Bilateral     2007    CATARACT EXTRACTION Bilateral 2005    CHOLECYSTECTOMY N/A 2001    COLONOSCOPY  02/2020    Negative    ENDOMETRIAL ABLATION      LAMINECTOMY AND MICRODISCECTOMY LUMBAR SPINE  12/21/2021    herniated disc    REPLACEMENT TOTAL KNEE Right 05/02/2022    TOTAL LAPAROSCOPIC HYSTERECTOMY  2008        Family History   Problem Relation Age of Onset    Diabetes Mother     Heart failure Mother     Ulcers Father     Skin cancer Father     Other Father         Heart issues    No Known Problems Sister     Arthritis Sister     Alzheimer's disease Sister     No Known Problems Sister     Lung cancer Brother     Other Brother         Heart issues    Hypertension Brother     Post-traumatic stress disorder Brother     Other Brother         Heart issues    Diabetes Brother     No Known Problems Maternal Grandmother         Social History     Socioeconomic History    Marital status: Single   Tobacco Use    Smoking status: Former     Packs/day: 0.50     Years: 10.00     Additional pack  "years: 0.00     Total pack years: 5.00     Types: Cigarettes     Start date:      Quit date:      Years since quittin.8     Passive exposure: Past    Smokeless tobacco: Never   Vaping Use    Vaping Use: Never used   Substance and Sexual Activity    Alcohol use: Yes     Comment: Rare occasion    Drug use: Never    Sexual activity: Defer            Objective   Vital Signs:  /82 (BP Location: Left arm, Patient Position: Sitting, Cuff Size: Adult)   Pulse 70   Temp 98.7 °F (37.1 °C) (Infrared)   Resp 16   Ht 165.1 cm (65\")   Wt 69.2 kg (152 lb 9.6 oz)   SpO2 98%   BMI 25.39 kg/m²   Estimated body mass index is 25.39 kg/m² as calculated from the following:    Height as of this encounter: 165.1 cm (65\").    Weight as of this encounter: 69.2 kg (152 lb 9.6 oz).        Physical Exam  General:  No acute distress, cheerful, well kept, interactive, good energy level.  Cardiac:  Heart regular rate and rhythm without murmur.  Respiratory:  Lungs clear to auscultation bilaterally and excellent air movement throughout.  Pulses:                            Right          Left         Bruit  Carotid                2+              2+           No  Radial                 2+              2+  Abd Ao               Not bounding  Femoral             Not palpable bilaterally  DP                      1+              1+  PT                      2+              2+  Venous:  Absent JVD. Absent lower extremity varicosities.  Dermatology:  Non-dystrophic toe nails. No Integument changes of the toes, feet, ankles, shins.  Lower Extremities:  No leg edema.  Toes are warm and pink  Abdomen: No suprapubic or cva tenderness    Result Review :                    Assessment and Plan   Diagnoses and all orders for this visit:    1. Benign hypertension (Primary)  Assessment & Plan:  MDM: Stable. Continue present medications without changes. Mild elevation in systolic BP possibly secondary to UTI, and or use of NSAID.  Continue to " monitor      Orders:  -     Comprehensive metabolic panel  -     Magnesium  -     TSH  -     amLODIPine (NORVASC) 5 MG tablet; Take 1 tablet by mouth Daily.  Dispense: 90 tablet; Refill: 0  -     metoprolol succinate XL (TOPROL-XL) 100 MG 24 hr tablet; Take 1 tablet by mouth Daily.  Dispense: 90 tablet; Refill: 0    2. Mixed hyperlipidemia  Assessment & Plan:  MDM: Stable. Continue present medications without changes.      Orders:  -     Lipid Panel  -     simvastatin (ZOCOR) 20 MG tablet; Take 1 tablet by mouth Every Night.  Dispense: 90 tablet; Refill: 1    3. Gastroesophageal reflux disease without esophagitis  Assessment & Plan:  MDM: Check for side effect of B12 loss due to PPI usage    Orders:  -     Vitamin B12    4. Inflammatory polyarthropathy  Assessment & Plan:  MDM:  Patient states take PPI daily, again, due to cold weather.  Patient counseled on side effects of PPI, including ill effects on the heart.  Take only when needed.  Take with food    Orders:  -     meloxicam (MOBIC) 15 MG tablet; Take 1 tablet by mouth Daily As Needed (ARTHRITIS).  Dispense: 90 tablet; Refill: 0    5. Encounter for immunization    6. Malodorous urine  -     POCT urinalysis dipstick, automated  -     Urine Culture - Urine, Urine, Clean Catch  -     ciprofloxacin (Cipro) 500 MG tablet; Take 1 tablet by mouth 2 (Two) Times a Day.  Dispense: 6 tablet; Refill: 0    7. Major depressive disorder in remission, unspecified whether recurrent  Assessment & Plan:  MDM: Stable. Continue present medications without changes.      Orders:  -     venlafaxine XR (Effexor XR) 37.5 MG 24 hr capsule; Take 1 capsule by mouth Daily. Take with Venlafaxine XR 75 mg, daily  Dispense: 90 capsule; Refill: 1  -     venlafaxine XR (Effexor XR) 75 MG 24 hr capsule; Take 1 capsule by mouth Daily. Take with Venlafaxine XR 37.5 mg daily  Dispense: 90 capsule; Refill: 1           Time spent: 40 min in review of chart, multiple issues, exam, history, discussion  of medical findings and medications    Follow Up   Return in about 3 months (around 1/25/2024) for Cor.    Patient was given instructions and counseling regarding her condition or for health maintenance advice. Please see specific information pulled into the AVS if appropriate.     Florencia Turner MD

## 2023-10-26 LAB
ALBUMIN SERPL-MCNC: 4.6 G/DL (ref 3.5–5.2)
ALBUMIN/GLOB SERPL: 1.6 G/DL
ALP SERPL-CCNC: 91 U/L (ref 39–117)
ALT SERPL W P-5'-P-CCNC: 19 U/L (ref 1–33)
ANION GAP SERPL CALCULATED.3IONS-SCNC: 11.7 MMOL/L (ref 5–15)
AST SERPL-CCNC: 22 U/L (ref 1–32)
BILIRUB SERPL-MCNC: 0.3 MG/DL (ref 0–1.2)
BUN SERPL-MCNC: 17 MG/DL (ref 8–23)
BUN/CREAT SERPL: 17 (ref 7–25)
CALCIUM SPEC-SCNC: 9.4 MG/DL (ref 8.6–10.5)
CHLORIDE SERPL-SCNC: 108 MMOL/L (ref 98–107)
CHOLEST SERPL-MCNC: 165 MG/DL (ref 0–200)
CO2 SERPL-SCNC: 24.3 MMOL/L (ref 22–29)
CREAT SERPL-MCNC: 1 MG/DL (ref 0.57–1)
EGFRCR SERPLBLD CKD-EPI 2021: 60.4 ML/MIN/1.73
GLOBULIN UR ELPH-MCNC: 2.8 GM/DL
GLUCOSE SERPL-MCNC: 94 MG/DL (ref 65–99)
HDLC SERPL-MCNC: 38 MG/DL (ref 40–60)
LDLC SERPL CALC-MCNC: 93 MG/DL (ref 0–100)
LDLC/HDLC SERPL: 2.29 {RATIO}
MAGNESIUM SERPL-MCNC: 2.2 MG/DL (ref 1.6–2.4)
POTASSIUM SERPL-SCNC: 4.4 MMOL/L (ref 3.5–5.2)
PROT SERPL-MCNC: 7.4 G/DL (ref 6–8.5)
SODIUM SERPL-SCNC: 144 MMOL/L (ref 136–145)
TRIGL SERPL-MCNC: 199 MG/DL (ref 0–150)
TSH SERPL DL<=0.05 MIU/L-ACNC: 3.42 UIU/ML (ref 0.27–4.2)
VIT B12 BLD-MCNC: 541 PG/ML (ref 211–946)
VLDLC SERPL-MCNC: 34 MG/DL (ref 5–40)

## 2023-10-27 RX ORDER — CIPROFLOXACIN 500 MG/1
500 TABLET, FILM COATED ORAL 2 TIMES DAILY
Qty: 8 TABLET | Refills: 0 | Status: SHIPPED | OUTPATIENT
Start: 2023-10-27

## 2023-10-28 LAB — BACTERIA SPEC AEROBE CULT: ABNORMAL

## 2023-10-30 RX ORDER — SULFAMETHOXAZOLE AND TRIMETHOPRIM 800; 160 MG/1; MG/1
1 TABLET ORAL 2 TIMES DAILY
Qty: 14 TABLET | Refills: 0 | Status: SHIPPED | OUTPATIENT
Start: 2023-10-30

## 2024-01-25 ENCOUNTER — OFFICE VISIT (OUTPATIENT)
Dept: FAMILY MEDICINE CLINIC | Facility: CLINIC | Age: 74
End: 2024-01-25
Payer: MEDICARE

## 2024-01-25 VITALS
DIASTOLIC BLOOD PRESSURE: 71 MMHG | SYSTOLIC BLOOD PRESSURE: 124 MMHG | WEIGHT: 156 LBS | HEART RATE: 71 BPM | HEIGHT: 65 IN | TEMPERATURE: 97.7 F | BODY MASS INDEX: 25.99 KG/M2 | RESPIRATION RATE: 16 BRPM | OXYGEN SATURATION: 100 %

## 2024-01-25 DIAGNOSIS — Z23 ENCOUNTER FOR IMMUNIZATION: ICD-10-CM

## 2024-01-25 DIAGNOSIS — I10 BENIGN HYPERTENSION: Primary | ICD-10-CM

## 2024-01-25 DIAGNOSIS — M06.4 INFLAMMATORY POLYARTHROPATHY: ICD-10-CM

## 2024-01-25 DIAGNOSIS — K21.9 GASTROESOPHAGEAL REFLUX DISEASE WITHOUT ESOPHAGITIS: ICD-10-CM

## 2024-01-25 RX ORDER — AMLODIPINE BESYLATE 5 MG/1
5 TABLET ORAL DAILY
Qty: 90 TABLET | Refills: 0 | Status: SHIPPED | OUTPATIENT
Start: 2024-01-25

## 2024-01-25 RX ORDER — METOPROLOL SUCCINATE 100 MG/1
100 TABLET, EXTENDED RELEASE ORAL DAILY
Qty: 90 TABLET | Refills: 0 | Status: SHIPPED | OUTPATIENT
Start: 2024-01-25

## 2024-01-25 RX ORDER — OMEPRAZOLE 20 MG/1
20 CAPSULE, DELAYED RELEASE ORAL 2 TIMES DAILY
Qty: 180 CAPSULE | Refills: 0 | Status: SHIPPED | OUTPATIENT
Start: 2024-01-25

## 2024-01-25 RX ORDER — MELOXICAM 15 MG/1
15 TABLET ORAL DAILY PRN
Qty: 30 TABLET | Refills: 1 | Status: SHIPPED | OUTPATIENT
Start: 2024-01-25

## 2024-01-25 NOTE — PROGRESS NOTES
Marylin Gay presents to [facility]      Chief Complaint  HTN    HPI     Respiratory:  SOA:  no. Exertional dyspnea: no.  Dyspnea at rest: no.  Flights of stairs climbable: 4.  Cardiology:  Chest pain: no.  Dizziness: no. Easily fatigued: no.  Pedal edema: no.  Light-headiness: no.  Symptoms of claudication: no.  Orthopnea: no.  Palpitations: no. Tachycardia: no.  Ophthalmology:  Last eye exam date: 5/2023 . Vision changes: none.  Gastroenterology:  Heartburn: no.  Nausea: no.        Current Outpatient Medications:     acetaminophen (TYLENOL) 325 MG tablet, Take 1 tablet by mouth Every 4 (Four) Hours As Needed (INFLAMMATORY ARTHRITIS)., Disp: , Rfl:     amLODIPine (NORVASC) 5 MG tablet, Take 1 tablet by mouth Daily., Disp: 90 tablet, Rfl: 0    Ascorbic Acid (Vitamin C) 500 MG capsule, Take 1 capsule by mouth Daily., Disp: , Rfl:     Cranberry 500 MG capsule, Take 4 capsules by mouth Daily., Disp: , Rfl:     meloxicam (MOBIC) 15 MG tablet, Take 1 tablet by mouth Daily As Needed (ARTHRITIS)., Disp: 30 tablet, Rfl: 1    metoprolol succinate XL (TOPROL-XL) 100 MG 24 hr tablet, Take 1 tablet by mouth Daily., Disp: 90 tablet, Rfl: 0    omeprazole (priLOSEC) 20 MG capsule, Take 1 capsule by mouth 2 (Two) Times a Day., Disp: 180 capsule, Rfl: 0    prenatal vitamin (prenatal, CLASSIC, vitamin) tablet, Take 1 tablet by mouth Daily., Disp: , Rfl:     simvastatin (ZOCOR) 20 MG tablet, Take 1 tablet by mouth Every Night., Disp: 90 tablet, Rfl: 1    venlafaxine XR (Effexor XR) 37.5 MG 24 hr capsule, Take 1 capsule by mouth Daily. Take with Venlafaxine XR 75 mg, daily, Disp: 90 capsule, Rfl: 1    venlafaxine XR (Effexor XR) 75 MG 24 hr capsule, Take 1 capsule by mouth Daily. Take with Venlafaxine XR 37.5 mg daily, Disp: 90 capsule, Rfl: 1     Allergies   Allergen Reactions    Ertapenem Other (See Comments) and Delirium     VISION CHANGES (SEEING LIGHTS)    Macrobid [Nitrofurantoin] Hives     Methotrexate Other (See Comments)     TONGUE LESION     Celebrex [Celecoxib] Other (See Comments)     ARTHRALGIA     Ace Inhibitors Cough     (SIDE EFFECT)    Mirabegron Rash        Past Medical History:   Diagnosis Date    History of melanoma     Insomnia 2005    Lumbar herniated disc     RIGHT SIDE - L5-S1       Past Surgical History:   Procedure Laterality Date    APPENDECTOMY N/A     BASAL CELL CARCINOMA EXCISION Right 2023    Right shoulder    BLADDER SUSPENSION  2018    CARPAL TUNNEL RELEASE Bilateral     2007    CATARACT EXTRACTION Bilateral     CHOLECYSTECTOMY N/A     COLONOSCOPY  2020    Negative    ENDOMETRIAL ABLATION      LAMINECTOMY AND MICRODISCECTOMY LUMBAR SPINE  2021    herniated disc    REPLACEMENT TOTAL KNEE Right 2022    TOTAL LAPAROSCOPIC HYSTERECTOMY  2008        Family History   Problem Relation Age of Onset    Diabetes Mother     Heart failure Mother     Ulcers Father     Skin cancer Father     Other Father         Heart issues    No Known Problems Sister     Arthritis Sister     Alzheimer's disease Sister     No Known Problems Sister     Lung cancer Brother     Other Brother         Heart issues    Hypertension Brother     Post-traumatic stress disorder Brother     Other Brother         Heart issues    Diabetes Brother     No Known Problems Maternal Grandmother         Social History     Socioeconomic History    Marital status:    Tobacco Use    Smoking status: Former     Packs/day: 0.50     Years: 10.00     Additional pack years: 0.00     Total pack years: 5.00     Types: Cigarettes     Start date:      Quit date:      Years since quittin.0     Passive exposure: Past    Smokeless tobacco: Never   Vaping Use    Vaping Use: Never used   Substance and Sexual Activity    Alcohol use: Yes     Comment: Rare occasion    Drug use: Never    Sexual activity: Defer            Objective   Vital Signs:  /71 (BP Location: Left arm, Patient  "Position: Sitting, Cuff Size: Adult)   Pulse 71   Temp 97.7 °F (36.5 °C) (Infrared)   Resp 16   Ht 165.1 cm (65\")   Wt 70.8 kg (156 lb)   SpO2 100%   BMI 25.96 kg/m²   Estimated body mass index is 25.96 kg/m² as calculated from the following:    Height as of this encounter: 165.1 cm (65\").    Weight as of this encounter: 70.8 kg (156 lb).        Physical Exam  General:  No acute distress, cheerful, well kept, interactive, good energy level.  Cardiac:  Heart regular rate and rhythm without murmur.  Respiratory:  Lungs clear to auscultation bilaterally and excellent air movement throughout.  Pulses:                            Right          Left         Bruit  Carotid                2+              2+           No  Radial                 2+              2+  Abd Ao               Not bounding  Femoral             Not palpable bilaterally  DP                      1+              1+  PT                      2+              2+  Venous:  Absent JVD. Absent lower extremity varicosities.  Dermatology:  Non-dystrophic toe nails. No Integument changes of the toes, feet, ankles, shins.  Lower Extremities:  No leg edema.  Toes are warm and pink    Result Review :                    Assessment and Plan   Diagnoses and all orders for this visit:    1. Benign hypertension (Primary)  -     amLODIPine (NORVASC) 5 MG tablet; Take 1 tablet by mouth Daily.  Dispense: 90 tablet; Refill: 0  -     metoprolol succinate XL (TOPROL-XL) 100 MG 24 hr tablet; Take 1 tablet by mouth Daily.  Dispense: 90 tablet; Refill: 0    2. Inflammatory polyarthropathy  -     meloxicam (MOBIC) 15 MG tablet; Take 1 tablet by mouth Daily As Needed (ARTHRITIS).  Dispense: 30 tablet; Refill: 1    3. Gastroesophageal reflux disease without esophagitis  -     omeprazole (priLOSEC) 20 MG capsule; Take 1 capsule by mouth 2 (Two) Times a Day.  Dispense: 180 capsule; Refill: 0  -     Ambulatory Referral to Gastroenterology    4. Encounter for immunization  -     " Pneumococcal Conjugate Vaccine 20-Valent (PCV20)      MDM: BP well controlled.  Continue present medication.  Patient counseled regarding NSAID caution.  She notes she takes it only when arthritic pain is intense.  GERD continues.  Refer to GI.  Monitor Vit B12, CMP, Mg, CBC while on PPI,        Follow Up   Return in about 3 months (around 4/25/2024) for Cor.    Patient was given instructions and counseling regarding her condition or for health maintenance advice. Please see specific information pulled into the AVS if appropriate.     Florencia Turner MD

## 2024-04-29 ENCOUNTER — OFFICE VISIT (OUTPATIENT)
Dept: FAMILY MEDICINE CLINIC | Facility: CLINIC | Age: 74
End: 2024-04-29
Payer: MEDICARE

## 2024-04-29 VITALS
DIASTOLIC BLOOD PRESSURE: 73 MMHG | BODY MASS INDEX: 25.72 KG/M2 | RESPIRATION RATE: 16 BRPM | SYSTOLIC BLOOD PRESSURE: 132 MMHG | TEMPERATURE: 97.7 F | WEIGHT: 154.4 LBS | OXYGEN SATURATION: 97 % | HEART RATE: 78 BPM | HEIGHT: 65 IN

## 2024-04-29 DIAGNOSIS — Z13.820 SCREENING FOR OSTEOPOROSIS: ICD-10-CM

## 2024-04-29 DIAGNOSIS — I10 BENIGN HYPERTENSION: ICD-10-CM

## 2024-04-29 DIAGNOSIS — F32.5 MAJOR DEPRESSIVE DISORDER IN REMISSION, UNSPECIFIED WHETHER RECURRENT: ICD-10-CM

## 2024-04-29 DIAGNOSIS — M06.4 INFLAMMATORY POLYARTHROPATHY: ICD-10-CM

## 2024-04-29 DIAGNOSIS — Z76.0 ENCOUNTER FOR MEDICATION REFILL: ICD-10-CM

## 2024-04-29 DIAGNOSIS — Z78.0 POST-MENOPAUSAL: ICD-10-CM

## 2024-04-29 DIAGNOSIS — K21.9 GASTROESOPHAGEAL REFLUX DISEASE WITHOUT ESOPHAGITIS: ICD-10-CM

## 2024-04-29 DIAGNOSIS — E78.2 MIXED HYPERLIPIDEMIA: ICD-10-CM

## 2024-04-29 DIAGNOSIS — Z00.00 ENCOUNTER FOR MEDICAL EXAMINATION TO ESTABLISH CARE: Primary | ICD-10-CM

## 2024-04-29 PROCEDURE — 1159F MED LIST DOCD IN RCRD: CPT | Performed by: NURSE PRACTITIONER

## 2024-04-29 PROCEDURE — 1160F RVW MEDS BY RX/DR IN RCRD: CPT | Performed by: NURSE PRACTITIONER

## 2024-04-29 PROCEDURE — 3078F DIAST BP <80 MM HG: CPT | Performed by: NURSE PRACTITIONER

## 2024-04-29 PROCEDURE — 3075F SYST BP GE 130 - 139MM HG: CPT | Performed by: NURSE PRACTITIONER

## 2024-04-29 PROCEDURE — 99214 OFFICE O/P EST MOD 30 MIN: CPT | Performed by: NURSE PRACTITIONER

## 2024-04-29 RX ORDER — MELOXICAM 15 MG/1
15 TABLET ORAL DAILY PRN
Qty: 30 TABLET | Refills: 1 | Status: SHIPPED | OUTPATIENT
Start: 2024-04-29

## 2024-04-29 RX ORDER — VENLAFAXINE HYDROCHLORIDE 75 MG/1
75 CAPSULE, EXTENDED RELEASE ORAL DAILY
Qty: 90 CAPSULE | Refills: 1 | Status: SHIPPED | OUTPATIENT
Start: 2024-04-29

## 2024-04-29 RX ORDER — VENLAFAXINE HYDROCHLORIDE 37.5 MG/1
37.5 CAPSULE, EXTENDED RELEASE ORAL DAILY
Qty: 90 CAPSULE | Refills: 1 | Status: SHIPPED | OUTPATIENT
Start: 2024-04-29

## 2024-04-29 RX ORDER — SIMVASTATIN 20 MG
20 TABLET ORAL NIGHTLY
Qty: 90 TABLET | Refills: 1 | Status: SHIPPED | OUTPATIENT
Start: 2024-04-29

## 2024-04-29 RX ORDER — OMEPRAZOLE 20 MG/1
20 CAPSULE, DELAYED RELEASE ORAL 2 TIMES DAILY
Qty: 180 CAPSULE | Refills: 0 | Status: SHIPPED | OUTPATIENT
Start: 2024-04-29

## 2024-04-29 RX ORDER — METOPROLOL SUCCINATE 100 MG/1
100 TABLET, EXTENDED RELEASE ORAL DAILY
Qty: 90 TABLET | Refills: 0 | Status: SHIPPED | OUTPATIENT
Start: 2024-04-29

## 2024-04-29 RX ORDER — AMLODIPINE BESYLATE 5 MG/1
5 TABLET ORAL DAILY
Qty: 90 TABLET | Refills: 0 | Status: SHIPPED | OUTPATIENT
Start: 2024-04-29

## 2024-04-29 NOTE — PROGRESS NOTES
Marylin Gay  [id number]  1950  female     04/29/2024      Chief Complaint  Establish Care (Off boarding from Dr Turner) and Hypertension (Follow up)    History of Present Illness  74-year-old female patient presents today to establish care.  Following up on hypertension.  Patient's blood pressure is stable at 132/73 today.  Patient states she used to see Dr. Turner.  Denies any complaints or concerns today.  Denies headache, lightheadedness, dizziness, numbness, tingling, cough, shortness of breath, chest pain, abdominal pain, NVD, dysuria, rash.  Patient on amlodipine and metoprolol for hypertension.  Tolerating well.  Reviewed lab work with patient from October 25, 2023 which was stable.  Patient states she is tolerating her venlafaxine well for her depression.  Her  passed away approximately 2 to 3 years ago.  On omeprazole for GERD.  Patient agrees to obtain DEXA bone density scan and requested to be done at [facility].  Denies history of osteoporosis.  Hypertension        Review of Systems   Constitutional: Negative.    HENT: Negative.     Eyes: Negative.    Respiratory: Negative.     Cardiovascular: Negative.    Gastrointestinal: Negative.    Endocrine: Negative.    Genitourinary: Negative.    Musculoskeletal: Negative.    Skin: Negative.    Neurological: Negative.    Hematological: Negative.    Psychiatric/Behavioral: Negative.         Past Medical History:   Diagnosis Date    History of melanoma     Insomnia 2005    Lumbar herniated disc     RIGHT SIDE - L5-S1       Past Surgical History:   Procedure Laterality Date    APPENDECTOMY N/A 2001    BASAL CELL CARCINOMA EXCISION Right 07/18/2023    Right shoulder    BLADDER SUSPENSION  2018    CARPAL TUNNEL RELEASE Bilateral     2007    CATARACT EXTRACTION Bilateral 2005    CHOLECYSTECTOMY N/A 2001    COLONOSCOPY  02/2020    Negative    ENDOMETRIAL ABLATION      LAMINECTOMY AND MICRODISCECTOMY LUMBAR SPINE  12/21/2021    herniated disc     "REPLACEMENT TOTAL KNEE Right 2022    TOTAL LAPAROSCOPIC HYSTERECTOMY  2008       Family History   Problem Relation Age of Onset    Diabetes Mother     Heart failure Mother     Ulcers Father     Skin cancer Father     Other Father         Heart issues    No Known Problems Sister     Arthritis Sister     Alzheimer's disease Sister     No Known Problems Sister     Lung cancer Brother     Other Brother         Heart issues    Hypertension Brother     Post-traumatic stress disorder Brother     Other Brother         Heart issues    Diabetes Brother     No Known Problems Maternal Grandmother        Social History     Socioeconomic History    Marital status:    Tobacco Use    Smoking status: Former     Current packs/day: 0.00     Average packs/day: 0.5 packs/day for 10.0 years (5.0 ttl pk-yrs)     Types: Cigarettes     Start date:      Quit date:      Years since quittin.3     Passive exposure: Past    Smokeless tobacco: Never   Vaping Use    Vaping status: Never Used   Substance and Sexual Activity    Alcohol use: Yes     Comment: Rare occasion    Drug use: Never    Sexual activity: Defer        Allergies   Allergen Reactions    Ertapenem Other (See Comments) and Delirium     VISION CHANGES (SEEING LIGHTS)    Macrobid [Nitrofurantoin] Hives    Methotrexate Other (See Comments)     TONGUE LESION     Celebrex [Celecoxib] Other (See Comments)     ARTHRALGIA     Ace Inhibitors Cough     (SIDE EFFECT)    Mirabegron Rash         Objective   Vital Signs:   /73 (BP Location: Left arm, Patient Position: Sitting, Cuff Size: Adult)   Pulse 78   Temp 97.7 °F (36.5 °C) (Oral)   Resp 16   Ht 165.1 cm (65\")   Wt 70 kg (154 lb 6.4 oz)   SpO2 97%   BMI 25.69 kg/m²       Physical Exam  Vitals and nursing note reviewed.   Constitutional:       General: She is not in acute distress.     Appearance: Normal appearance. She is not ill-appearing, toxic-appearing or diaphoretic.   HENT:      Head: " Normocephalic and atraumatic.      Jaw: There is normal jaw occlusion.      Right Ear: Hearing and external ear normal.      Left Ear: Hearing and external ear normal.      Nose: Nose normal.      Mouth/Throat:      Lips: Pink.   Eyes:      General: Lids are normal. Vision grossly intact. Gaze aligned appropriately.      Extraocular Movements: Extraocular movements intact.      Conjunctiva/sclera: Conjunctivae normal.      Pupils: Pupils are equal, round, and reactive to light.   Cardiovascular:      Rate and Rhythm: Normal rate and regular rhythm.      Pulses: Normal pulses.           Carotid pulses are 2+ on the right side and 2+ on the left side.       Radial pulses are 2+ on the right side and 2+ on the left side.        Dorsalis pedis pulses are 2+ on the right side and 2+ on the left side.        Posterior tibial pulses are 2+ on the right side and 2+ on the left side.      Heart sounds: Normal heart sounds, S1 normal and S2 normal. No murmur heard.  Pulmonary:      Effort: Pulmonary effort is normal.      Breath sounds: Normal breath sounds and air entry.   Abdominal:      General: Abdomen is flat. Bowel sounds are normal. There is no distension or abdominal bruit.      Palpations: Abdomen is soft.      Tenderness: There is no abdominal tenderness.   Musculoskeletal:         General: Normal range of motion.      Cervical back: Full passive range of motion without pain, normal range of motion and neck supple.      Right lower leg: No edema.      Left lower leg: No edema.   Skin:     General: Skin is warm and dry.      Capillary Refill: Capillary refill takes less than 2 seconds.      Coloration: Skin is not cyanotic or pale.      Findings: No bruising, erythema or rash.   Neurological:      General: No focal deficit present.      Mental Status: She is alert and oriented to person, place, and time. Mental status is at baseline.      GCS: GCS eye subscore is 4. GCS verbal subscore is 5. GCS motor subscore is 6.       Cranial Nerves: Cranial nerves 2-12 are intact. No cranial nerve deficit.      Sensory: Sensation is intact. No sensory deficit.      Motor: Motor function is intact. No weakness.      Coordination: Coordination is intact. Coordination normal.      Gait: Gait is intact. Gait normal.      Deep Tendon Reflexes: Reflexes normal.   Psychiatric:         Attention and Perception: Attention and perception normal.         Mood and Affect: Mood and affect normal.         Speech: Speech normal.         Behavior: Behavior normal. Behavior is cooperative.         Thought Content: Thought content normal.         Cognition and Memory: Cognition and memory normal.         Judgment: Judgment normal.                 Assessment and Plan   Diagnoses and all orders for this visit:    1. Encounter for medical examination to establish care (Primary)    2. Encounter for medication refill  -     venlafaxine XR (Effexor XR) 75 MG 24 hr capsule; Take 1 capsule by mouth Daily. Take with Venlafaxine XR 37.5 mg daily  Dispense: 90 capsule; Refill: 1  -     venlafaxine XR (Effexor XR) 37.5 MG 24 hr capsule; Take 1 capsule by mouth Daily. Take with Venlafaxine XR 75 mg, daily  Dispense: 90 capsule; Refill: 1  -     simvastatin (ZOCOR) 20 MG tablet; Take 1 tablet by mouth Every Night.  Dispense: 90 tablet; Refill: 1  -     omeprazole (priLOSEC) 20 MG capsule; Take 1 capsule by mouth 2 (Two) Times a Day.  Dispense: 180 capsule; Refill: 0  -     metoprolol succinate XL (TOPROL-XL) 100 MG 24 hr tablet; Take 1 tablet by mouth Daily.  Dispense: 90 tablet; Refill: 0  -     meloxicam (MOBIC) 15 MG tablet; Take 1 tablet by mouth Daily As Needed (ARTHRITIS).  Dispense: 30 tablet; Refill: 1  -     amLODIPine (NORVASC) 5 MG tablet; Take 1 tablet by mouth Daily.  Dispense: 90 tablet; Refill: 0    3. Major depressive disorder in remission, unspecified whether recurrent  -     venlafaxine XR (Effexor XR) 75 MG 24 hr capsule; Take 1 capsule by mouth Daily.  Take with Venlafaxine XR 37.5 mg daily  Dispense: 90 capsule; Refill: 1  -     venlafaxine XR (Effexor XR) 37.5 MG 24 hr capsule; Take 1 capsule by mouth Daily. Take with Venlafaxine XR 75 mg, daily  Dispense: 90 capsule; Refill: 1    4. Mixed hyperlipidemia  -     simvastatin (ZOCOR) 20 MG tablet; Take 1 tablet by mouth Every Night.  Dispense: 90 tablet; Refill: 1    5. Gastroesophageal reflux disease without esophagitis  -     omeprazole (priLOSEC) 20 MG capsule; Take 1 capsule by mouth 2 (Two) Times a Day.  Dispense: 180 capsule; Refill: 0    6. Benign hypertension  -     metoprolol succinate XL (TOPROL-XL) 100 MG 24 hr tablet; Take 1 tablet by mouth Daily.  Dispense: 90 tablet; Refill: 0  -     amLODIPine (NORVASC) 5 MG tablet; Take 1 tablet by mouth Daily.  Dispense: 90 tablet; Refill: 0    7. Inflammatory polyarthropathy  -     meloxicam (MOBIC) 15 MG tablet; Take 1 tablet by mouth Daily As Needed (ARTHRITIS).  Dispense: 30 tablet; Refill: 1    8. Post-menopausal  -     DEXA Bone Density Axial; Future    9. Screening for osteoporosis  -     DEXA Bone Density Axial; Future    -Establish care.  -Hypertension stable.  Continue current dose of amlodipine and metoprolol.  -GERD stable.  Continue current dose of omeprazole.  -Continue simvastatin for hyperlipidemia.   -Inflammatory polyarthropathy stable.  Continue current dose of meloxicam.  -Ordered DEXA bone density scan at [facility].  -Discussed ER red flags.  -Instructed patient to follow-up with me in 2 months for annual Medicare wellness exam and for routine fasting labs at that appointment.    Follow Up   Return in about 2 months (around 6/29/2024) for Medicare Wellness.    There are no Patient Instructions on file for this visit.

## 2024-05-20 DIAGNOSIS — Z13.820 SCREENING FOR OSTEOPOROSIS: ICD-10-CM

## 2024-05-20 DIAGNOSIS — Z78.0 POST-MENOPAUSAL: ICD-10-CM

## 2024-05-20 DIAGNOSIS — M85.852 OSTEOPENIA OF LEFT HIP: Primary | ICD-10-CM

## 2024-05-20 RX ORDER — OYSTER SHELL CALCIUM WITH VITAMIN D 500; 200 MG/1; [IU]/1
2 TABLET, FILM COATED ORAL DAILY
Qty: 60 TABLET | Refills: 0 | Status: SHIPPED | OUTPATIENT
Start: 2024-05-20 | End: 2024-05-21 | Stop reason: SDUPTHER

## 2024-05-21 RX ORDER — OYSTER SHELL CALCIUM WITH VITAMIN D 500; 200 MG/1; [IU]/1
2 TABLET, FILM COATED ORAL DAILY
Qty: 60 TABLET | Refills: 0 | Status: SHIPPED | OUTPATIENT
Start: 2024-05-21

## 2024-07-11 DIAGNOSIS — I10 BENIGN HYPERTENSION: ICD-10-CM

## 2024-07-11 DIAGNOSIS — K21.9 GASTROESOPHAGEAL REFLUX DISEASE WITHOUT ESOPHAGITIS: ICD-10-CM

## 2024-07-11 DIAGNOSIS — Z76.0 ENCOUNTER FOR MEDICATION REFILL: ICD-10-CM

## 2024-07-11 RX ORDER — METOPROLOL SUCCINATE 100 MG/1
100 TABLET, EXTENDED RELEASE ORAL DAILY
Qty: 90 TABLET | Refills: 0 | Status: SHIPPED | OUTPATIENT
Start: 2024-07-11

## 2024-07-11 RX ORDER — OMEPRAZOLE 20 MG/1
20 CAPSULE, DELAYED RELEASE ORAL 2 TIMES DAILY
Qty: 180 CAPSULE | Refills: 0 | Status: SHIPPED | OUTPATIENT
Start: 2024-07-11

## 2024-07-11 RX ORDER — AMLODIPINE BESYLATE 5 MG
5 TABLET ORAL DAILY
Qty: 90 TABLET | Refills: 0 | Status: SHIPPED | OUTPATIENT
Start: 2024-07-11

## 2024-08-08 ENCOUNTER — TELEPHONE (OUTPATIENT)
Dept: FAMILY MEDICINE CLINIC | Facility: CLINIC | Age: 74
End: 2024-08-08

## 2024-08-08 NOTE — TELEPHONE ENCOUNTER
Caller: Marylin Gay    Relationship: Self    Best call back number: [phone]     What orders are you requesting (i.e. lab or imaging): LABS FOR 8/19 VISIT    Where will you receive your lab/imaging services: IN OFFICE    WOULD LIKE TO COME IN PRIOR TO VISIT FOR LABS. PLEASE CALL WHEN ORDERS IN

## 2024-08-19 ENCOUNTER — OFFICE VISIT (OUTPATIENT)
Dept: FAMILY MEDICINE CLINIC | Facility: CLINIC | Age: 74
End: 2024-08-19
Payer: MEDICARE

## 2024-08-19 VITALS
SYSTOLIC BLOOD PRESSURE: 147 MMHG | HEIGHT: 65 IN | DIASTOLIC BLOOD PRESSURE: 68 MMHG | OXYGEN SATURATION: 100 % | BODY MASS INDEX: 25.16 KG/M2 | HEART RATE: 67 BPM | TEMPERATURE: 99.1 F | WEIGHT: 151 LBS | RESPIRATION RATE: 18 BRPM

## 2024-08-19 DIAGNOSIS — I10 BENIGN HYPERTENSION: Primary | ICD-10-CM

## 2024-08-19 DIAGNOSIS — Z76.0 ENCOUNTER FOR MEDICATION REFILL: ICD-10-CM

## 2024-08-19 DIAGNOSIS — E78.2 MIXED HYPERLIPIDEMIA: ICD-10-CM

## 2024-08-19 PROCEDURE — 1160F RVW MEDS BY RX/DR IN RCRD: CPT | Performed by: NURSE PRACTITIONER

## 2024-08-19 PROCEDURE — 1126F AMNT PAIN NOTED NONE PRSNT: CPT | Performed by: NURSE PRACTITIONER

## 2024-08-19 PROCEDURE — 1159F MED LIST DOCD IN RCRD: CPT | Performed by: NURSE PRACTITIONER

## 2024-08-19 PROCEDURE — 3077F SYST BP >= 140 MM HG: CPT | Performed by: NURSE PRACTITIONER

## 2024-08-19 PROCEDURE — 3078F DIAST BP <80 MM HG: CPT | Performed by: NURSE PRACTITIONER

## 2024-08-19 PROCEDURE — 99214 OFFICE O/P EST MOD 30 MIN: CPT | Performed by: NURSE PRACTITIONER

## 2024-08-19 RX ORDER — METOPROLOL SUCCINATE 100 MG/1
100 TABLET, EXTENDED RELEASE ORAL DAILY
Qty: 90 TABLET | Refills: 0 | Status: SHIPPED | OUTPATIENT
Start: 2024-08-19

## 2024-08-19 RX ORDER — AMLODIPINE BESYLATE 5 MG/1
5 TABLET ORAL DAILY
Qty: 90 TABLET | Refills: 0 | Status: SHIPPED | OUTPATIENT
Start: 2024-08-19

## 2024-08-19 RX ORDER — SIMVASTATIN 20 MG
20 TABLET ORAL NIGHTLY
Qty: 90 TABLET | Refills: 1 | Status: SHIPPED | OUTPATIENT
Start: 2024-08-19

## 2024-08-19 NOTE — PROGRESS NOTES
Marylin Gay  [id number]  1950  female     08/19/2024      Chief Complaint  Hypertension    History of Present Illness  74-year-old female patient presents today to follow-up on hypertension.  Blood pressure mildly elevated today but stable.  Denies headache, lightheadedness, dizziness, numbness, tingling, blurry vision, tinnitus, cough, shortness of breath, chest pain, palpitations, leg swelling, abdominal pain, NVD, dysuria, rash.  Needing blood pressure medications refilled.  Agrees to set up annual Medicare wellness visit in 2 months and to obtain fasting labs at that appointment.             Review of Systems   Constitutional: Negative.    HENT: Negative.     Eyes: Negative.    Respiratory: Negative.     Cardiovascular: Negative.    Gastrointestinal: Negative.    Endocrine: Negative.    Genitourinary: Negative.    Musculoskeletal: Negative.    Skin: Negative.    Neurological: Negative.    Hematological: Negative.    Psychiatric/Behavioral: Negative.         Past Medical History:   Diagnosis Date    History of melanoma     Insomnia 2005    Lumbar herniated disc     RIGHT SIDE - L5-S1       Past Surgical History:   Procedure Laterality Date    APPENDECTOMY N/A 2001    BASAL CELL CARCINOMA EXCISION Right 07/18/2023    Right shoulder    BLADDER SUSPENSION  2018    CARPAL TUNNEL RELEASE Bilateral     2007    CATARACT EXTRACTION Bilateral 2005    CHOLECYSTECTOMY N/A 2001    COLONOSCOPY  02/2020    Negative    ENDOMETRIAL ABLATION      LAMINECTOMY AND MICRODISCECTOMY LUMBAR SPINE  12/21/2021    herniated disc    REPLACEMENT TOTAL KNEE Right 05/02/2022    TOTAL LAPAROSCOPIC HYSTERECTOMY  2008       Family History   Problem Relation Age of Onset    Diabetes Mother     Heart failure Mother     Ulcers Father     Skin cancer Father     Other Father         Heart issues    No Known Problems Sister     Arthritis Sister     Alzheimer's disease Sister     No Known Problems Sister     Lung cancer Brother     Other Brother   "       Heart issues    Hypertension Brother     Post-traumatic stress disorder Brother     Other Brother         Heart issues    Diabetes Brother     No Known Problems Maternal Grandmother        Social History     Socioeconomic History    Marital status:    Tobacco Use    Smoking status: Former     Current packs/day: 0.00     Average packs/day: 0.5 packs/day for 10.0 years (5.0 ttl pk-yrs)     Types: Cigarettes     Start date:      Quit date:      Years since quittin.6     Passive exposure: Past    Smokeless tobacco: Never   Vaping Use    Vaping status: Never Used   Substance and Sexual Activity    Alcohol use: Yes     Comment: Rare occasion    Drug use: Never    Sexual activity: Defer        Allergies   Allergen Reactions    Ertapenem Other (See Comments) and Delirium     VISION CHANGES (SEEING LIGHTS)    Macrobid [Nitrofurantoin] Hives    Methotrexate Other (See Comments)     TONGUE LESION     Celebrex [Celecoxib] Other (See Comments)     ARTHRALGIA     Ace Inhibitors Cough     (SIDE EFFECT)    Mirabegron Rash         Objective   Vital Signs:   /68 (BP Location: Left arm, Patient Position: Sitting, Cuff Size: Adult)   Pulse 67   Temp 99.1 °F (37.3 °C) (Temporal)   Resp 18   Ht 165.1 cm (65\")   Wt 68.5 kg (151 lb)   SpO2 100%   BMI 25.13 kg/m²       Physical Exam  Vitals and nursing note reviewed.   Constitutional:       General: She is not in acute distress.     Appearance: Normal appearance. She is not ill-appearing, toxic-appearing or diaphoretic.   HENT:      Head: Normocephalic and atraumatic.      Jaw: There is normal jaw occlusion.      Right Ear: Hearing and external ear normal.      Left Ear: Hearing and external ear normal.      Nose: Nose normal.      Mouth/Throat:      Lips: Pink.   Eyes:      General: Lids are normal. Vision grossly intact. Gaze aligned appropriately.      Extraocular Movements: Extraocular movements intact.      Conjunctiva/sclera: Conjunctivae " normal.      Pupils: Pupils are equal, round, and reactive to light.   Cardiovascular:      Rate and Rhythm: Normal rate and regular rhythm.      Pulses: Normal pulses.           Carotid pulses are 2+ on the right side and 2+ on the left side.       Radial pulses are 2+ on the right side and 2+ on the left side.        Dorsalis pedis pulses are 2+ on the right side and 2+ on the left side.        Posterior tibial pulses are 2+ on the right side and 2+ on the left side.      Heart sounds: Normal heart sounds, S1 normal and S2 normal. No murmur heard.  Pulmonary:      Effort: Pulmonary effort is normal.      Breath sounds: Normal breath sounds and air entry.   Abdominal:      General: Abdomen is flat. Bowel sounds are normal. There is no distension or abdominal bruit.      Palpations: Abdomen is soft.      Tenderness: There is no abdominal tenderness.   Musculoskeletal:         General: Normal range of motion.      Cervical back: Full passive range of motion without pain, normal range of motion and neck supple.      Right lower leg: No edema.      Left lower leg: No edema.   Skin:     General: Skin is warm and dry.      Capillary Refill: Capillary refill takes less than 2 seconds.      Coloration: Skin is not cyanotic or pale.      Findings: No bruising, erythema or rash.   Neurological:      General: No focal deficit present.      Mental Status: She is alert and oriented to person, place, and time. Mental status is at baseline.      GCS: GCS eye subscore is 4. GCS verbal subscore is 5. GCS motor subscore is 6.      Cranial Nerves: Cranial nerves 2-12 are intact. No cranial nerve deficit.      Sensory: Sensation is intact. No sensory deficit.      Motor: Motor function is intact. No weakness.      Coordination: Coordination is intact. Coordination normal.      Gait: Gait is intact. Gait normal.      Deep Tendon Reflexes: Reflexes normal.   Psychiatric:         Attention and Perception: Attention and perception normal.          Mood and Affect: Mood and affect normal.         Speech: Speech normal.         Behavior: Behavior normal. Behavior is cooperative.         Thought Content: Thought content normal.         Cognition and Memory: Cognition and memory normal.         Judgment: Judgment normal.                 Assessment and Plan   Diagnoses and all orders for this visit:    1. Benign hypertension (Primary)  -     amLODIPine (Norvasc) 5 MG tablet; Take 1 tablet by mouth Daily.  Dispense: 90 tablet; Refill: 0  -     metoprolol succinate XL (Toprol XL) 100 MG 24 hr tablet; Take 1 tablet by mouth Daily.  Dispense: 90 tablet; Refill: 0    2. Encounter for medication refill  -     amLODIPine (Norvasc) 5 MG tablet; Take 1 tablet by mouth Daily.  Dispense: 90 tablet; Refill: 0  -     metoprolol succinate XL (Toprol XL) 100 MG 24 hr tablet; Take 1 tablet by mouth Daily.  Dispense: 90 tablet; Refill: 0  -     simvastatin (ZOCOR) 20 MG tablet; Take 1 tablet by mouth Every Night.  Dispense: 90 tablet; Refill: 1    3. Mixed hyperlipidemia  -     simvastatin (ZOCOR) 20 MG tablet; Take 1 tablet by mouth Every Night.  Dispense: 90 tablet; Refill: 1    Hypertension  -Stable.  Continue current dose of hypertensive medications.  Refilled today.    Mixed hyperlipidemia  -On simvastatin.  Refilled today.    -Discussed ER red flags.  -Instructed patient to follow-up with me in 2 months for annual Medicare wellness exam and fasting labs at that appointment.    Follow Up   Return in about 2 months (around 10/19/2024) for Medicare Wellness.    There are no Patient Instructions on file for this visit.

## 2024-10-25 ENCOUNTER — OFFICE VISIT (OUTPATIENT)
Dept: FAMILY MEDICINE CLINIC | Facility: CLINIC | Age: 74
End: 2024-10-25
Payer: MEDICARE

## 2024-10-25 VITALS
BODY MASS INDEX: 24.83 KG/M2 | OXYGEN SATURATION: 98 % | RESPIRATION RATE: 16 BRPM | SYSTOLIC BLOOD PRESSURE: 144 MMHG | DIASTOLIC BLOOD PRESSURE: 86 MMHG | HEART RATE: 76 BPM | HEIGHT: 65 IN | WEIGHT: 149 LBS | TEMPERATURE: 97.1 F

## 2024-10-25 DIAGNOSIS — Z13.29 SCREENING FOR THYROID DISORDER: ICD-10-CM

## 2024-10-25 DIAGNOSIS — Z90.722 HISTORY OF TOTAL HYSTERECTOMY WITH BILATERAL SALPINGO-OOPHORECTOMY (BSO): ICD-10-CM

## 2024-10-25 DIAGNOSIS — Z23 NEED FOR INFLUENZA VACCINATION: ICD-10-CM

## 2024-10-25 DIAGNOSIS — F32.5 MAJOR DEPRESSIVE DISORDER IN REMISSION, UNSPECIFIED WHETHER RECURRENT: ICD-10-CM

## 2024-10-25 DIAGNOSIS — Z76.0 ENCOUNTER FOR MEDICATION REFILL: ICD-10-CM

## 2024-10-25 DIAGNOSIS — Z12.11 ENCOUNTER FOR SCREENING FOR MALIGNANT NEOPLASM OF COLON: ICD-10-CM

## 2024-10-25 DIAGNOSIS — Z13.31 SCREENING FOR DEPRESSION: ICD-10-CM

## 2024-10-25 DIAGNOSIS — Z12.31 SCREENING MAMMOGRAM FOR BREAST CANCER: ICD-10-CM

## 2024-10-25 DIAGNOSIS — E78.2 MIXED HYPERLIPIDEMIA: ICD-10-CM

## 2024-10-25 DIAGNOSIS — Z90.710 HISTORY OF TOTAL HYSTERECTOMY WITH BILATERAL SALPINGO-OOPHORECTOMY (BSO): ICD-10-CM

## 2024-10-25 DIAGNOSIS — K21.9 GASTROESOPHAGEAL REFLUX DISEASE WITHOUT ESOPHAGITIS: ICD-10-CM

## 2024-10-25 DIAGNOSIS — Z90.79 HISTORY OF TOTAL HYSTERECTOMY WITH BILATERAL SALPINGO-OOPHORECTOMY (BSO): ICD-10-CM

## 2024-10-25 DIAGNOSIS — Z00.00 ENCOUNTER FOR SUBSEQUENT ANNUAL WELLNESS VISIT (AWV) IN MEDICARE PATIENT: Primary | ICD-10-CM

## 2024-10-25 DIAGNOSIS — I10 BENIGN HYPERTENSION: ICD-10-CM

## 2024-10-25 DIAGNOSIS — R73.9 HYPERGLYCEMIA: ICD-10-CM

## 2024-10-25 DIAGNOSIS — M06.4 INFLAMMATORY POLYARTHROPATHY: ICD-10-CM

## 2024-10-25 LAB
BASOPHILS # BLD AUTO: 0.08 10*3/MM3 (ref 0–0.2)
BASOPHILS NFR BLD AUTO: 0.9 % (ref 0–1.5)
DEPRECATED RDW RBC AUTO: 42.9 FL (ref 37–54)
EOSINOPHIL # BLD AUTO: 0.29 10*3/MM3 (ref 0–0.4)
EOSINOPHIL NFR BLD AUTO: 3.4 % (ref 0.3–6.2)
ERYTHROCYTE [DISTWIDTH] IN BLOOD BY AUTOMATED COUNT: 11.8 % (ref 12.3–15.4)
HCT VFR BLD AUTO: 40.5 % (ref 34–46.6)
HGB BLD-MCNC: 13.9 G/DL (ref 12–15.9)
IMM GRANULOCYTES # BLD AUTO: 0.03 10*3/MM3 (ref 0–0.05)
IMM GRANULOCYTES NFR BLD AUTO: 0.4 % (ref 0–0.5)
LYMPHOCYTES # BLD AUTO: 2.83 10*3/MM3 (ref 0.7–3.1)
LYMPHOCYTES NFR BLD AUTO: 33.3 % (ref 19.6–45.3)
MCH RBC QN AUTO: 34.2 PG (ref 26.6–33)
MCHC RBC AUTO-ENTMCNC: 34.3 G/DL (ref 31.5–35.7)
MCV RBC AUTO: 99.5 FL (ref 79–97)
MONOCYTES # BLD AUTO: 0.91 10*3/MM3 (ref 0.1–0.9)
MONOCYTES NFR BLD AUTO: 10.7 % (ref 5–12)
NEUTROPHILS NFR BLD AUTO: 4.35 10*3/MM3 (ref 1.7–7)
NEUTROPHILS NFR BLD AUTO: 51.3 % (ref 42.7–76)
NRBC BLD AUTO-RTO: 0 /100 WBC (ref 0–0.2)
PLATELET # BLD AUTO: 301 10*3/MM3 (ref 140–450)
PMV BLD AUTO: 10.7 FL (ref 6–12)
RBC # BLD AUTO: 4.07 10*6/MM3 (ref 3.77–5.28)
TSH SERPL DL<=0.05 MIU/L-ACNC: 2.15 UIU/ML (ref 0.27–4.2)
WBC NRBC COR # BLD AUTO: 8.49 10*3/MM3 (ref 3.4–10.8)

## 2024-10-25 PROCEDURE — 83036 HEMOGLOBIN GLYCOSYLATED A1C: CPT | Performed by: NURSE PRACTITIONER

## 2024-10-25 PROCEDURE — 80053 COMPREHEN METABOLIC PANEL: CPT | Performed by: NURSE PRACTITIONER

## 2024-10-25 PROCEDURE — 85025 COMPLETE CBC W/AUTO DIFF WBC: CPT | Performed by: NURSE PRACTITIONER

## 2024-10-25 PROCEDURE — 84439 ASSAY OF FREE THYROXINE: CPT | Performed by: NURSE PRACTITIONER

## 2024-10-25 PROCEDURE — 84481 FREE ASSAY (FT-3): CPT | Performed by: NURSE PRACTITIONER

## 2024-10-25 PROCEDURE — 84443 ASSAY THYROID STIM HORMONE: CPT | Performed by: NURSE PRACTITIONER

## 2024-10-25 PROCEDURE — 80061 LIPID PANEL: CPT | Performed by: NURSE PRACTITIONER

## 2024-10-25 RX ORDER — VENLAFAXINE HYDROCHLORIDE 37.5 MG/1
37.5 CAPSULE, EXTENDED RELEASE ORAL DAILY
Qty: 90 CAPSULE | Refills: 1 | Status: SHIPPED | OUTPATIENT
Start: 2024-10-25

## 2024-10-25 RX ORDER — VENLAFAXINE HYDROCHLORIDE 75 MG/1
75 CAPSULE, EXTENDED RELEASE ORAL DAILY
Qty: 90 CAPSULE | Refills: 1 | Status: SHIPPED | OUTPATIENT
Start: 2024-10-25

## 2024-10-25 RX ORDER — MELOXICAM 15 MG/1
15 TABLET ORAL DAILY PRN
Qty: 30 TABLET | Refills: 1 | Status: SHIPPED | OUTPATIENT
Start: 2024-10-25

## 2024-10-25 NOTE — PROGRESS NOTES
Venipuncture Blood Specimen Collection  Venipuncture performed in RT ARM by Jia Gil with good hemostasis. Patient tolerated the procedure well without complications.   10/25/24   Jia Gil

## 2024-10-25 NOTE — PROGRESS NOTES
Subjective   The ABCs of the Annual Wellness Visit  Medicare Wellness Visit      Marylin Gay is a 74 y.o. patient who presents for a Medicare Wellness Visit.    The following portions of the patient's history were reviewed and   updated as appropriate: allergies, current medications, past family history, past medical history, past social history, past surgical history, and problem list.    Compared to one year ago, the patient's physical   health is better.  Compared to one year ago, the patient's mental   health is better.    Recent Hospitalizations:  She was not admitted to the hospital during the last year.     Current Medical Providers:  Patient Care Team:  Humble Lomas APRN as PCP - General (Nurse Practitioner)    Outpatient Medications Prior to Visit   Medication Sig Dispense Refill   • acetaminophen (TYLENOL) 325 MG tablet Take 1 tablet by mouth Every 4 (Four) Hours As Needed (INFLAMMATORY ARTHRITIS).     • amLODIPine (Norvasc) 5 MG tablet Take 1 tablet by mouth Daily. 90 tablet 0   • Ascorbic Acid (Vitamin C) 500 MG capsule Take 1 capsule by mouth Daily.     • Cranberry 500 MG capsule Take 4 capsules by mouth Daily.     • metoprolol succinate XL (Toprol XL) 100 MG 24 hr tablet Take 1 tablet by mouth Daily. 90 tablet 0   • prenatal vitamin (prenatal, CLASSIC, vitamin) tablet Take 1 tablet by mouth Daily.     • simvastatin (ZOCOR) 20 MG tablet Take 1 tablet by mouth Every Night. 90 tablet 1   • meloxicam (MOBIC) 15 MG tablet Take 1 tablet by mouth Daily As Needed (ARTHRITIS). 30 tablet 1   • omeprazole (priLOSEC) 20 MG capsule TAKE ONE CAPSULE BY MOUTH TWICE A  capsule 0   • venlafaxine XR (Effexor XR) 37.5 MG 24 hr capsule Take 1 capsule by mouth Daily. Take with Venlafaxine XR 75 mg, daily 90 capsule 1   • venlafaxine XR (Effexor XR) 75 MG 24 hr capsule Take 1 capsule by mouth Daily. Take with Venlafaxine XR 37.5 mg daily 90 capsule 1   • Calcium Carbonate-Vitamin D 500-5 MG-MCG tablet Take 2  "tablets by mouth Daily. (Patient not taking: Reported on 10/25/2024) 60 tablet 0     No facility-administered medications prior to visit.     No opioid medication identified on active medication list. I have reviewed chart for other potential  high risk medication/s and harmful drug interactions in the elderly.      Aspirin is not on active medication list.  Aspirin use is not indicated based on review of current medical condition/s. Risk of harm outweighs potential benefits.  .    Patient Active Problem List   Diagnosis   • Benign hypertension   • Mixed hyperlipidemia   • HEAVEN (generalized anxiety disorder)   • Gastroesophageal reflux disease without esophagitis   • Inflammatory polyarthropathy   • Major depressive disorder in remission     Advance Care Planning Advance Directive is not on file.  ACP discussion was held with the patient during this visit. Patient does not have an advance directive, information provided.            Objective   Vitals:    10/25/24 0917   BP: 144/86   BP Location: Right arm   Patient Position: Sitting   Cuff Size: Adult   Pulse: 76   Resp: 16   Temp: 97.1 °F (36.2 °C)   TempSrc: Temporal   SpO2: 98%   Weight: 67.6 kg (149 lb)   Height: 165.1 cm (65\")   PainSc: 0-No pain       Estimated body mass index is 24.79 kg/m² as calculated from the following:    Height as of this encounter: 165.1 cm (65\").    Weight as of this encounter: 67.6 kg (149 lb).    BMI is within normal parameters. No other follow-up for BMI required.       Does the patient have evidence of cognitive impairment? No                                                                                                Health  Risk Assessment    Smoking Status:  Social History     Tobacco Use   Smoking Status Former   • Current packs/day: 0.00   • Average packs/day: 0.5 packs/day for 10.0 years (5.0 ttl pk-yrs)   • Types: Cigarettes   • Start date:    • Quit date:    • Years since quittin.8   • Passive exposure: Past "   Smokeless Tobacco Never     Alcohol Consumption:  Social History     Substance and Sexual Activity   Alcohol Use Yes    Comment: Rare occasion       Fall Risk Screen  IRAIDAADI Fall Risk Assessment was completed, and patient is at LOW risk for falls.Assessment completed on:10/25/2024    Depression Screening:      10/25/2024     9:00 AM   PHQ-2/PHQ-9 Depression Screening   Little interest or pleasure in doing things Not at all   Feeling down, depressed, or hopeless Not at all     Health Habits and Functional and Cognitive Screening:      10/25/2024     9:00 AM   Functional & Cognitive Status   Do you have difficulty preparing food and eating? No   Do you have difficulty bathing yourself, getting dressed or grooming yourself? No   Do you have difficulty using the toilet? No   Do you have difficulty moving around from place to place? No   Do you have trouble with steps or getting out of a bed or a chair? No   Current Diet Well Balanced Diet   Dental Exam Not up to date   Eye Exam Up to date   Exercise (times per week) 7 times per week   Current Exercises Include Walking;House Cleaning;Yard Work   Do you need help using the phone?  No   Are you deaf or do you have serious difficulty hearing?  No   Do you need help to go to places out of walking distance? No   Do you need help shopping? No   Do you need help preparing meals?  No   Do you need help with housework?  No   Do you need help with laundry? No   Do you need help taking your medications? No   Do you need help managing money? No   Do you ever drive or ride in a car without wearing a seat belt? No   Have you felt unusual stress, anger or loneliness in the last month? No   Who do you live with? Alone   If you need help, do you have trouble finding someone available to you? No   Have you been bothered in the last four weeks by sexual problems? No   Do you have difficulty concentrating, remembering or making decisions? No           Age-appropriate Screening  Schedule:  Refer to the list below for future screening recommendations based on patient's age, sex and/or medical conditions. Orders for these recommended tests are listed in the plan section. The patient has been provided with a written plan.    Health Maintenance List  Health Maintenance   Topic Date Due   • LIPID PANEL  10/25/2024   • COVID-19 Vaccine (1 - 2023-24 season) 10/25/2025 (Originally 9/1/2024)   • MAMMOGRAM  06/22/2025   • ANNUAL WELLNESS VISIT  10/25/2025   • COLORECTAL CANCER SCREENING  04/20/2026   • DXA SCAN  05/10/2026   • TDAP/TD VACCINES (2 - Td or Tdap) 02/28/2030   • HEPATITIS C SCREENING  Completed   • INFLUENZA VACCINE  Completed   • Pneumococcal Vaccine 65+  Completed   • ZOSTER VACCINE  Completed                                                                                                                                                CMS Preventative Services Quick Reference  Risk Factors Identified During Encounter  Immunizations Discussed/Encouraged: Tdap, Influenza, Pneumococcal 23, Prevnar 20 (Pneumococcal 20-valent conjugate), Shingrix, COVID19, and RSV (Respiratory Syncytial Virus)  Dental Screening Recommended  Vision Screening Recommended    The above risks/problems have been discussed with the patient.  Pertinent information has been shared with the patient in the After Visit Summary.  An After Visit Summary and PPPS were made available to the patient.    Follow Up:   Next Medicare Wellness visit to be scheduled in 1 year.         Additional E&M Note during same encounter follows:  Patient has additional, significant, and separately identifiable condition(s)/problem(s) that require work above and beyond the Medicare Wellness Visit     Chief Complaint  Medicare Wellness-subsequent    Subjective   HPI      Review of Systems   Constitutional: Negative.    HENT: Negative.     Eyes: Negative.    Respiratory: Negative.     Cardiovascular: Negative.    Gastrointestinal: Negative.   "  Endocrine: Negative.    Genitourinary: Negative.    Musculoskeletal: Negative.    Skin: Negative.    Neurological: Negative.    Hematological: Negative.    Psychiatric/Behavioral: Negative.                Objective   Vital Signs:  /86 (BP Location: Right arm, Patient Position: Sitting, Cuff Size: Adult)   Pulse 76   Temp 97.1 °F (36.2 °C) (Temporal)   Resp 16   Ht 165.1 cm (65\")   Wt 67.6 kg (149 lb)   SpO2 98%   BMI 24.79 kg/m²   Physical Exam  Vitals and nursing note reviewed.   Constitutional:       General: She is not in acute distress.     Appearance: Normal appearance. She is not ill-appearing, toxic-appearing or diaphoretic.   HENT:      Head: Normocephalic and atraumatic.      Jaw: There is normal jaw occlusion.      Right Ear: Hearing, tympanic membrane, ear canal and external ear normal.      Left Ear: Hearing, tympanic membrane, ear canal and external ear normal.      Nose: Nose normal.      Mouth/Throat:      Lips: Pink.      Pharynx: Oropharynx is clear. Uvula midline.   Eyes:      General: Lids are normal. Vision grossly intact. Gaze aligned appropriately.      Extraocular Movements: Extraocular movements intact.      Conjunctiva/sclera: Conjunctivae normal.      Pupils: Pupils are equal, round, and reactive to light.   Cardiovascular:      Rate and Rhythm: Normal rate and regular rhythm.      Pulses: Normal pulses.           Carotid pulses are 2+ on the right side and 2+ on the left side.       Radial pulses are 2+ on the right side and 2+ on the left side.        Dorsalis pedis pulses are 2+ on the right side and 2+ on the left side.        Posterior tibial pulses are 2+ on the right side and 2+ on the left side.      Heart sounds: Normal heart sounds, S1 normal and S2 normal. No murmur heard.  Pulmonary:      Effort: Pulmonary effort is normal.      Breath sounds: Normal breath sounds and air entry.   Abdominal:      General: Abdomen is flat. Bowel sounds are normal. There is no " distension or abdominal bruit.      Palpations: Abdomen is soft.      Tenderness: There is no abdominal tenderness.   Musculoskeletal:         General: Normal range of motion.      Cervical back: Full passive range of motion without pain, normal range of motion and neck supple.      Right lower leg: No edema.      Left lower leg: No edema.   Skin:     General: Skin is warm and dry.      Capillary Refill: Capillary refill takes less than 2 seconds.      Coloration: Skin is not cyanotic or pale.      Findings: No bruising, erythema or rash.   Neurological:      General: No focal deficit present.      Mental Status: She is alert and oriented to person, place, and time. Mental status is at baseline.      GCS: GCS eye subscore is 4. GCS verbal subscore is 5. GCS motor subscore is 6.      Cranial Nerves: Cranial nerves 2-12 are intact. No cranial nerve deficit.      Sensory: Sensation is intact. No sensory deficit.      Motor: Motor function is intact. No weakness.      Coordination: Coordination is intact. Coordination normal.      Gait: Gait is intact. Gait normal.      Deep Tendon Reflexes: Reflexes normal.   Psychiatric:         Attention and Perception: Attention and perception normal.         Mood and Affect: Mood and affect normal.         Speech: Speech normal.         Behavior: Behavior normal. Behavior is cooperative.         Thought Content: Thought content normal.         Cognition and Memory: Cognition and memory normal.         Judgment: Judgment normal.               Assessment and Plan               Encounter for subsequent annual wellness visit (AWV) in Medicare patient    Need for influenza vaccination    Encounter for medication refill    Major depressive disorder in remission, unspecified whether recurrent  -Stable.  Continue current meds.  Refilled meds today.  Inflammatory polyarthropathy  Stable.  Continue meloxicam.  Refilled.  Gastroesophageal reflux disease without esophagitis  Stable.  Continue  omeprazole.  Refilled today.  Mixed hyperlipidemia  On simvastatin.  Benign hypertension  Stable.  Continue current meds.  Screening mammogram for breast cancer    Hyperglycemia    Screening for depression [Z13.31]    History of total hysterectomy with bilateral salpingo-oophorectomy (BSO)    Screening for thyroid disorder    Encounter for screening for malignant neoplasm of colon      -Pending fasting labs today.    -History of total hysterectomy and bilateral salpingo-oophorectomy.    -Ordered screening mammogram at [facility] per patient request.    -Ordered Cologuard.    -Discussed ER red flags.  -Instructed patient to follow-up with me in 3 months for hypertension.      Orders Placed This Encounter   Procedures   • Mammo Screening Bilateral With CAD     Standing Status:   Future     Standing Expiration Date:   10/25/2025     Scheduling Instructions:      At [facility]     Order Specific Question:   Reason for Exam:     Answer:   Screening     Order Specific Question:   Does this patient have a diabetic monitoring/medication delivering device on?     Answer:   No     Order Specific Question:   Release to patient     Answer:   Routine Release [[id number]]   • Fluzone >6mos   • Comprehensive metabolic panel     Order Specific Question:   Release to patient     Answer:   Routine Release [[id number]]   • Lipid panel     Order Specific Question:   Release to patient     Answer:   Routine Release [[id number]]   • TSH Rfx On Abnormal To Free T4     Order Specific Question:   Release to patient     Answer:   Routine Release [[id number]]   • Hemoglobin A1c     Order Specific Question:   Release to patient     Answer:   Routine Release [[id number]]   • T3, free     Order Specific Question:   Release to patient     Answer:   Routine Release [[id number]]   • T4, free     Order Specific Question:   Release to patient     Answer:   Routine Release [[id number]]   • CBC Auto Differential     Order Specific  Question:   Release to patient     Answer:   Routine Release [[id number]]   • Cologuard - Stool, Per Rectum     Standing Status:   Future     Standing Expiration Date:   10/25/2025     Order Specific Question:   Release to patient     Answer:   Routine Release [[id number]]   • CBC w AUTO Differential     Order Specific Question:   Manual Differential     Answer:   No     Order Specific Question:   Release to patient     Answer:   Routine Release [[id number]]     New Medications Ordered This Visit   Medications   • venlafaxine XR (Effexor XR) 75 MG 24 hr capsule     Sig: Take 1 capsule by mouth Daily. Take with Venlafaxine XR 37.5 mg daily     Dispense:  90 capsule     Refill:  1   • venlafaxine XR (Effexor XR) 37.5 MG 24 hr capsule     Sig: Take 1 capsule by mouth Daily. Take with Venlafaxine XR 75 mg, daily     Dispense:  90 capsule     Refill:  1   • meloxicam (MOBIC) 15 MG tablet     Sig: Take 1 tablet by mouth Daily As Needed (ARTHRITIS).     Dispense:  30 tablet     Refill:  1   • omeprazole (priLOSEC) 20 MG capsule     Sig: Take 1 capsule by mouth 2 (Two) Times a Day.     Dispense:  180 capsule     Refill:  0        I spent 45 minutes caring for Marylin on this date of service. This time includes time spent by me in the following activities:preparing for the visit, reviewing tests, obtaining and/or reviewing a separately obtained history, performing a medically appropriate examination and/or evaluation , counseling and educating the patient/family/caregiver, ordering medications, tests, or procedures, referring and communicating with other health care professionals , documenting information in the medical record, independently interpreting results and communicating that information with the patient/family/caregiver, and care coordination  Follow Up   Return in about 3 months (around 1/25/2025) for Recheck.  Patient was given instructions and counseling regarding her condition or for health maintenance advice.  Please see specific information pulled into the AVS if appropriate.

## 2024-10-26 LAB
ALBUMIN SERPL-MCNC: 4.6 G/DL (ref 3.5–5.2)
ALBUMIN/GLOB SERPL: 1.6 G/DL
ALP SERPL-CCNC: 95 U/L (ref 39–117)
ALT SERPL W P-5'-P-CCNC: 17 U/L (ref 1–33)
ANION GAP SERPL CALCULATED.3IONS-SCNC: 12.1 MMOL/L (ref 5–15)
AST SERPL-CCNC: 19 U/L (ref 1–32)
BILIRUB SERPL-MCNC: 0.3 MG/DL (ref 0–1.2)
BUN SERPL-MCNC: 15 MG/DL (ref 8–23)
BUN/CREAT SERPL: 15.2 (ref 7–25)
CALCIUM SPEC-SCNC: 9.8 MG/DL (ref 8.6–10.5)
CHLORIDE SERPL-SCNC: 104 MMOL/L (ref 98–107)
CHOLEST SERPL-MCNC: 182 MG/DL (ref 0–200)
CO2 SERPL-SCNC: 25.9 MMOL/L (ref 22–29)
CREAT SERPL-MCNC: 0.99 MG/DL (ref 0.57–1)
EGFRCR SERPLBLD CKD-EPI 2021: 60.7 ML/MIN/1.73
GLOBULIN UR ELPH-MCNC: 2.8 GM/DL
GLUCOSE SERPL-MCNC: 100 MG/DL (ref 65–99)
HBA1C MFR BLD: 4.9 % (ref 4.8–5.6)
HDLC SERPL-MCNC: 44 MG/DL (ref 40–60)
LDLC SERPL CALC-MCNC: 114 MG/DL (ref 0–100)
LDLC/HDLC SERPL: 2.52 {RATIO}
POTASSIUM SERPL-SCNC: 4.1 MMOL/L (ref 3.5–5.2)
PROT SERPL-MCNC: 7.4 G/DL (ref 6–8.5)
SODIUM SERPL-SCNC: 142 MMOL/L (ref 136–145)
T3FREE SERPL-MCNC: 3.28 PG/ML (ref 2–4.4)
T4 FREE SERPL-MCNC: 1.04 NG/DL (ref 0.92–1.68)
TRIGL SERPL-MCNC: 136 MG/DL (ref 0–150)
VLDLC SERPL-MCNC: 24 MG/DL (ref 5–40)

## 2024-11-13 DIAGNOSIS — Z12.31 SCREENING MAMMOGRAM FOR BREAST CANCER: ICD-10-CM

## 2024-11-13 DIAGNOSIS — Z00.00 ENCOUNTER FOR SUBSEQUENT ANNUAL WELLNESS VISIT (AWV) IN MEDICARE PATIENT: ICD-10-CM

## 2025-01-27 ENCOUNTER — OFFICE VISIT (OUTPATIENT)
Dept: FAMILY MEDICINE CLINIC | Facility: CLINIC | Age: 75
End: 2025-01-27
Payer: MEDICARE

## 2025-01-27 VITALS
TEMPERATURE: 98 F | HEART RATE: 59 BPM | SYSTOLIC BLOOD PRESSURE: 150 MMHG | RESPIRATION RATE: 16 BRPM | DIASTOLIC BLOOD PRESSURE: 82 MMHG | HEIGHT: 65 IN | WEIGHT: 150.6 LBS | BODY MASS INDEX: 25.09 KG/M2 | OXYGEN SATURATION: 100 %

## 2025-01-27 DIAGNOSIS — Z13.31 SCREENING FOR DEPRESSION: ICD-10-CM

## 2025-01-27 DIAGNOSIS — M06.4 INFLAMMATORY POLYARTHROPATHY: ICD-10-CM

## 2025-01-27 DIAGNOSIS — K21.9 GASTROESOPHAGEAL REFLUX DISEASE WITHOUT ESOPHAGITIS: ICD-10-CM

## 2025-01-27 DIAGNOSIS — Z96.651 HISTORY OF TOTAL RIGHT KNEE REPLACEMENT: ICD-10-CM

## 2025-01-27 DIAGNOSIS — F41.1 GAD (GENERALIZED ANXIETY DISORDER): ICD-10-CM

## 2025-01-27 DIAGNOSIS — I10 BENIGN HYPERTENSION: Primary | ICD-10-CM

## 2025-01-27 DIAGNOSIS — E66.3 OVERWEIGHT (BMI 25.0-29.9): ICD-10-CM

## 2025-01-27 DIAGNOSIS — Z76.0 ENCOUNTER FOR MEDICATION REFILL: ICD-10-CM

## 2025-01-27 DIAGNOSIS — E78.2 MIXED HYPERLIPIDEMIA: ICD-10-CM

## 2025-01-27 PROCEDURE — 99214 OFFICE O/P EST MOD 30 MIN: CPT | Performed by: NURSE PRACTITIONER

## 2025-01-27 PROCEDURE — 3077F SYST BP >= 140 MM HG: CPT | Performed by: NURSE PRACTITIONER

## 2025-01-27 PROCEDURE — 3079F DIAST BP 80-89 MM HG: CPT | Performed by: NURSE PRACTITIONER

## 2025-01-27 PROCEDURE — 1126F AMNT PAIN NOTED NONE PRSNT: CPT | Performed by: NURSE PRACTITIONER

## 2025-01-27 PROCEDURE — 1159F MED LIST DOCD IN RCRD: CPT | Performed by: NURSE PRACTITIONER

## 2025-01-27 PROCEDURE — 1160F RVW MEDS BY RX/DR IN RCRD: CPT | Performed by: NURSE PRACTITIONER

## 2025-01-27 RX ORDER — METOPROLOL SUCCINATE 100 MG/1
100 TABLET, EXTENDED RELEASE ORAL DAILY
Qty: 90 TABLET | Refills: 0 | Status: SHIPPED | OUTPATIENT
Start: 2025-01-27

## 2025-01-27 RX ORDER — AMLODIPINE BESYLATE 5 MG/1
5 TABLET ORAL DAILY
Qty: 90 TABLET | Refills: 0 | Status: SHIPPED | OUTPATIENT
Start: 2025-01-27

## 2025-01-27 RX ORDER — SIMVASTATIN 20 MG
20 TABLET ORAL NIGHTLY
Qty: 90 TABLET | Refills: 0 | Status: SHIPPED | OUTPATIENT
Start: 2025-01-27

## 2025-01-27 RX ORDER — MELOXICAM 15 MG/1
15 TABLET ORAL DAILY PRN
Qty: 30 TABLET | Refills: 1 | Status: SHIPPED | OUTPATIENT
Start: 2025-01-27

## 2025-01-27 NOTE — ASSESSMENT & PLAN NOTE
Patient's (Body mass index is 25.06 kg/m².) indicates that they are overweight with health conditions that include hypertension . Weight is newly identified. BMI is above average; BMI management plan is completed. We discussed portion control and increasing exercise.

## 2025-01-27 NOTE — PROGRESS NOTES
Marylin Gay  [id number]  1950  female     01/27/2025      Chief Complaint  Anxiety and Hypertension    History of Present Illness  75-year-old female patient presents today to follow-up on anxiety and hypertension.  Needing medications refilled today.  Screened for depression, denies feeling down.  Screened for HEAVEN, see questionnaire.  Blood pressure mildly elevated but stable.  Patient asymptomatic.  Denies headache, blurry vision, tinnitus, lightheadedness, dizziness, numbness, tingling, cough, shortness of breath, chest pain, palpitations, leg swelling, abdominal pain, NVD, dysuria, rash.  Anxiety    Hypertension  Associated symptoms include anxiety.               Review of Systems   Constitutional: Negative.    HENT: Negative.     Eyes: Negative.    Respiratory: Negative.     Cardiovascular: Negative.    Gastrointestinal: Negative.    Endocrine: Negative.    Genitourinary: Negative.    Musculoskeletal: Negative.    Skin: Negative.    Neurological: Negative.    Hematological: Negative.    Psychiatric/Behavioral: Negative.         Past Medical History:   Diagnosis Date    History of melanoma     Insomnia 2005    Lumbar herniated disc     RIGHT SIDE - L5-S1       Past Surgical History:   Procedure Laterality Date    APPENDECTOMY N/A 2001    BASAL CELL CARCINOMA EXCISION Right 07/18/2023    Right shoulder    BILATERAL SALPINGO OOPHORECTOMY      BLADDER SUSPENSION  2018    CARPAL TUNNEL RELEASE Bilateral     2007    CATARACT EXTRACTION Bilateral 2005    CHOLECYSTECTOMY N/A 2001    COLONOSCOPY  02/2020    Negative    ENDOMETRIAL ABLATION      LAMINECTOMY AND MICRODISCECTOMY LUMBAR SPINE  12/21/2021    herniated disc    REPLACEMENT TOTAL KNEE Right 05/02/2022    TOTAL LAPAROSCOPIC HYSTERECTOMY  2008       Family History   Problem Relation Age of Onset    Diabetes Mother     Heart failure Mother     Ulcers Father     Skin cancer Father     Other Father         Heart issues    No Known Problems Sister     Arthritis  "Sister     Alzheimer's disease Sister     No Known Problems Sister     Lung cancer Brother     Other Brother         Heart issues    Hypertension Brother     Post-traumatic stress disorder Brother     Other Brother         Heart issues    Diabetes Brother     No Known Problems Maternal Grandmother        Social History     Socioeconomic History    Marital status:    Tobacco Use    Smoking status: Former     Current packs/day: 0.00     Average packs/day: 0.5 packs/day for 10.0 years (5.0 ttl pk-yrs)     Types: Cigarettes     Start date:      Quit date:      Years since quittin.1     Passive exposure: Past    Smokeless tobacco: Never   Vaping Use    Vaping status: Never Used   Substance and Sexual Activity    Alcohol use: Yes     Comment: Rare occasion    Drug use: Never    Sexual activity: Defer        Allergies   Allergen Reactions    Ertapenem Other (See Comments) and Delirium     VISION CHANGES (SEEING LIGHTS)    Macrobid [Nitrofurantoin] Hives    Methotrexate Other (See Comments)     TONGUE LESION     Celebrex [Celecoxib] Other (See Comments)     ARTHRALGIA     Ace Inhibitors Cough     (SIDE EFFECT)    Mirabegron Rash         Objective   Vital Signs:   /82 (BP Location: Left arm, Patient Position: Sitting, Cuff Size: Adult)   Pulse 59   Temp 98 °F (36.7 °C) (Temporal)   Resp 16   Ht 165.1 cm (65\")   Wt 68.3 kg (150 lb 9.6 oz)   SpO2 100%   BMI 25.06 kg/m²       Physical Exam  Vitals and nursing note reviewed.   Constitutional:       General: She is not in acute distress.     Appearance: Normal appearance. She is not ill-appearing, toxic-appearing or diaphoretic.   HENT:      Head: Normocephalic and atraumatic.      Jaw: There is normal jaw occlusion.      Right Ear: Hearing and external ear normal.      Left Ear: Hearing and external ear normal.      Nose: Nose normal.      Mouth/Throat:      Lips: Pink.   Eyes:      General: Lids are normal. Vision grossly intact. Gaze aligned " appropriately.      Extraocular Movements: Extraocular movements intact.      Conjunctiva/sclera: Conjunctivae normal.      Pupils: Pupils are equal, round, and reactive to light.   Cardiovascular:      Rate and Rhythm: Normal rate and regular rhythm.      Pulses: Normal pulses.           Carotid pulses are 2+ on the right side and 2+ on the left side.       Radial pulses are 2+ on the right side and 2+ on the left side.        Dorsalis pedis pulses are 2+ on the right side and 2+ on the left side.        Posterior tibial pulses are 2+ on the right side and 2+ on the left side.      Heart sounds: Normal heart sounds, S1 normal and S2 normal. No murmur heard.  Pulmonary:      Effort: Pulmonary effort is normal.      Breath sounds: Normal breath sounds and air entry.   Abdominal:      General: Abdomen is flat. Bowel sounds are normal. There is no distension or abdominal bruit.      Palpations: Abdomen is soft.      Tenderness: There is no abdominal tenderness.   Musculoskeletal:         General: Normal range of motion.      Cervical back: Full passive range of motion without pain, normal range of motion and neck supple.      Right lower leg: No edema.      Left lower leg: No edema.   Skin:     General: Skin is warm and dry.      Capillary Refill: Capillary refill takes less than 2 seconds.      Coloration: Skin is not cyanotic or pale.      Findings: No bruising, erythema or rash.   Neurological:      General: No focal deficit present.      Mental Status: She is alert and oriented to person, place, and time. Mental status is at baseline.      GCS: GCS eye subscore is 4. GCS verbal subscore is 5. GCS motor subscore is 6.      Cranial Nerves: Cranial nerves 2-12 are intact. No cranial nerve deficit.      Sensory: Sensation is intact. No sensory deficit.      Motor: Motor function is intact. No weakness.      Coordination: Coordination is intact. Coordination normal.      Gait: Gait is intact. Gait normal.      Deep  Tendon Reflexes: Reflexes normal.   Psychiatric:         Attention and Perception: Attention and perception normal.         Mood and Affect: Mood and affect normal.         Speech: Speech normal.         Behavior: Behavior normal. Behavior is cooperative.         Thought Content: Thought content normal.         Cognition and Memory: Cognition and memory normal.         Judgment: Judgment normal.                 Assessment and Plan   Diagnoses and all orders for this visit:    1. Benign hypertension (Primary)  -     metoprolol succinate XL (Toprol XL) 100 MG 24 hr tablet; Take 1 tablet by mouth Daily.  Dispense: 90 tablet; Refill: 0  -     amLODIPine (Norvasc) 5 MG tablet; Take 1 tablet by mouth Daily.  Dispense: 90 tablet; Refill: 0    2. Encounter for medication refill  -     meloxicam (MOBIC) 15 MG tablet; Take 1 tablet by mouth Daily As Needed (ARTHRITIS).  Dispense: 30 tablet; Refill: 1  -     metoprolol succinate XL (Toprol XL) 100 MG 24 hr tablet; Take 1 tablet by mouth Daily.  Dispense: 90 tablet; Refill: 0  -     amLODIPine (Norvasc) 5 MG tablet; Take 1 tablet by mouth Daily.  Dispense: 90 tablet; Refill: 0  -     omeprazole (priLOSEC) 20 MG capsule; Take 1 capsule by mouth 2 (Two) Times a Day.  Dispense: 180 capsule; Refill: 0  -     simvastatin (ZOCOR) 20 MG tablet; Take 1 tablet by mouth Every Night.  Dispense: 90 tablet; Refill: 0    3. Inflammatory polyarthropathy  -     meloxicam (MOBIC) 15 MG tablet; Take 1 tablet by mouth Daily As Needed (ARTHRITIS).  Dispense: 30 tablet; Refill: 1    4. Gastroesophageal reflux disease without esophagitis  -     omeprazole (priLOSEC) 20 MG capsule; Take 1 capsule by mouth 2 (Two) Times a Day.  Dispense: 180 capsule; Refill: 0    5. Mixed hyperlipidemia  -     simvastatin (ZOCOR) 20 MG tablet; Take 1 tablet by mouth Every Night.  Dispense: 90 tablet; Refill: 0    6. Overweight (BMI 25.0-29.9)  Assessment & Plan:  Patient's (Body mass index is 25.06 kg/m².) indicates  that they are overweight with health conditions that include hypertension . Weight is newly identified. BMI is above average; BMI management plan is completed. We discussed portion control and increasing exercise.       7. HEAVEN (generalized anxiety disorder)    8. Screening for depression    9. History of total right knee replacement  -     meloxicam (MOBIC) 15 MG tablet; Take 1 tablet by mouth Daily As Needed (ARTHRITIS).  Dispense: 30 tablet; Refill: 1    Hypertension  -Blood pressure mildly elevated but stable.  Patient asymptomatic.  Continue current BP meds.  Refilled today.  -Most recent labs from October 2024 unremarkable.  -Instructed patient to monitor her blood pressure at home daily and call my office in 1 week with results.  -Instructed patient to monitor her blood pressure at home daily and call my office if her blood pressure is greater than 150/90.    GERD  -Stable.  Continue current dose of omeprazole.  Refilled today.    Mixed hyperlipidemia  -Stable.  On simvastatin.    Generalized anxiety disorder  -Stable.  Screening for HEAVEN, see questionnaire.  Continue current meds.    History of total right knee replacement  -On meloxicam.  Refilled today.    Inflammatory arthropathy  -Stable.  Continue current dose meloxicam.  Refilled today.    -Discussed ER red flags.  -Instructed patient to follow-up with me in 6 weeks for hypertension.    Follow Up   Return in about 6 weeks (around 3/10/2025) for Recheck.    There are no Patient Instructions on file for this visit.

## 2025-02-05 ENCOUNTER — TELEPHONE (OUTPATIENT)
Dept: FAMILY MEDICINE CLINIC | Facility: CLINIC | Age: 75
End: 2025-02-05
Payer: MEDICARE

## 2025-02-05 NOTE — TELEPHONE ENCOUNTER
CALLED AND SPOKE WITH PT INFORMED MESSAGE FROM [facility]  PT VOICE UNDERSTOOD.    BP is looking better. Keep up the good work.

## 2025-02-05 NOTE — TELEPHONE ENCOUNTER
Caller: Marylin Gay    Relationship to patient: Self    Best call back number: [phone]    Patient is needing: PT WAS TOLD BY PCP TO TAKE HER BP AT HOME FOR 6 DAYS AND CALL IN WITH REPORT. PT STOPPED TAKING TYLENOL PM ON 1/27/25 WHICH SHE BELIEVES IS WHAT WAS MAKING IT ELEVATED.       1/28/25 156/85  1/29/25 152/71  1/30/25 153/94  1/31/25 151/73  2/1/25 164/80  2/2/25 154/85  2/3/25 138/37  2/5/25 139/83

## 2025-03-10 ENCOUNTER — OFFICE VISIT (OUTPATIENT)
Dept: FAMILY MEDICINE CLINIC | Facility: CLINIC | Age: 75
End: 2025-03-10
Payer: MEDICARE

## 2025-03-10 VITALS
HEIGHT: 65 IN | SYSTOLIC BLOOD PRESSURE: 144 MMHG | OXYGEN SATURATION: 100 % | DIASTOLIC BLOOD PRESSURE: 68 MMHG | WEIGHT: 152.2 LBS | BODY MASS INDEX: 25.36 KG/M2 | TEMPERATURE: 96.9 F | HEART RATE: 62 BPM | RESPIRATION RATE: 20 BRPM

## 2025-03-10 DIAGNOSIS — E78.2 MIXED HYPERLIPIDEMIA: ICD-10-CM

## 2025-03-10 DIAGNOSIS — F32.5 MAJOR DEPRESSIVE DISORDER IN REMISSION, UNSPECIFIED WHETHER RECURRENT: ICD-10-CM

## 2025-03-10 DIAGNOSIS — F41.1 GAD (GENERALIZED ANXIETY DISORDER): ICD-10-CM

## 2025-03-10 DIAGNOSIS — Z76.0 ENCOUNTER FOR MEDICATION REFILL: ICD-10-CM

## 2025-03-10 DIAGNOSIS — I10 BENIGN HYPERTENSION: Primary | ICD-10-CM

## 2025-03-10 DIAGNOSIS — K21.9 GASTROESOPHAGEAL REFLUX DISEASE WITHOUT ESOPHAGITIS: ICD-10-CM

## 2025-03-10 DIAGNOSIS — Z13.31 SCREENING FOR DEPRESSION: ICD-10-CM

## 2025-03-10 PROCEDURE — 3077F SYST BP >= 140 MM HG: CPT | Performed by: NURSE PRACTITIONER

## 2025-03-10 PROCEDURE — 1159F MED LIST DOCD IN RCRD: CPT | Performed by: NURSE PRACTITIONER

## 2025-03-10 PROCEDURE — 1126F AMNT PAIN NOTED NONE PRSNT: CPT | Performed by: NURSE PRACTITIONER

## 2025-03-10 PROCEDURE — 99214 OFFICE O/P EST MOD 30 MIN: CPT | Performed by: NURSE PRACTITIONER

## 2025-03-10 PROCEDURE — 1160F RVW MEDS BY RX/DR IN RCRD: CPT | Performed by: NURSE PRACTITIONER

## 2025-03-10 PROCEDURE — 3078F DIAST BP <80 MM HG: CPT | Performed by: NURSE PRACTITIONER

## 2025-03-10 PROCEDURE — 96127 BRIEF EMOTIONAL/BEHAV ASSMT: CPT | Performed by: NURSE PRACTITIONER

## 2025-03-10 RX ORDER — AMLODIPINE BESYLATE 10 MG/1
10 TABLET ORAL DAILY
Qty: 90 TABLET | Refills: 0 | Status: SHIPPED | OUTPATIENT
Start: 2025-03-10

## 2025-03-10 RX ORDER — VENLAFAXINE HYDROCHLORIDE 37.5 MG/1
37.5 CAPSULE, EXTENDED RELEASE ORAL DAILY
Qty: 90 CAPSULE | Refills: 1 | Status: SHIPPED | OUTPATIENT
Start: 2025-03-10

## 2025-03-10 RX ORDER — SIMVASTATIN 20 MG
20 TABLET ORAL NIGHTLY
Qty: 90 TABLET | Refills: 0 | Status: SHIPPED | OUTPATIENT
Start: 2025-03-10

## 2025-03-10 RX ORDER — METOPROLOL SUCCINATE 100 MG/1
100 TABLET, EXTENDED RELEASE ORAL DAILY
Qty: 90 TABLET | Refills: 0 | Status: SHIPPED | OUTPATIENT
Start: 2025-03-10

## 2025-03-10 RX ORDER — VENLAFAXINE HYDROCHLORIDE 75 MG/1
75 CAPSULE, EXTENDED RELEASE ORAL DAILY
Qty: 90 CAPSULE | Refills: 1 | Status: SHIPPED | OUTPATIENT
Start: 2025-03-10

## 2025-03-10 RX ORDER — OMEPRAZOLE 20 MG/1
20 CAPSULE, DELAYED RELEASE ORAL 2 TIMES DAILY
Qty: 180 CAPSULE | Refills: 0 | Status: SHIPPED | OUTPATIENT
Start: 2025-03-10

## 2025-03-10 NOTE — PROGRESS NOTES
Marylin Gay  [id number]  1950  female     03/10/2025      Chief Complaint  Hypertension    History of Present Illness  75-year-old female patient presents today to follow-up on hypertension, anxiety, depression..  Blood pressure mildly elevated today but stable.  Denies headache, lightheadedness, dizziness, numbness, tingling, cough, shortness of breath, chest pain, palpitations, leg swelling, abdominal pain, NVD, dysuria, rash.  Needs a refill on her metoprolol, simvastatin, venlafaxine, omeprazole.  Screened for depression, see questionnaire.  Denies suicidal ideation/plan.  Denies intent of harming self or others.  Hypertension                Review of Systems   Constitutional: Negative.    HENT: Negative.     Eyes: Negative.    Respiratory: Negative.     Cardiovascular: Negative.    Gastrointestinal: Negative.    Endocrine: Negative.    Genitourinary: Negative.    Musculoskeletal: Negative.    Skin: Negative.    Neurological: Negative.    Hematological: Negative.    Psychiatric/Behavioral: Negative.         Past Medical History:   Diagnosis Date    History of melanoma     Insomnia 2005    Lumbar herniated disc     RIGHT SIDE - L5-S1       Past Surgical History:   Procedure Laterality Date    APPENDECTOMY N/A 2001    BASAL CELL CARCINOMA EXCISION Right 07/18/2023    Right shoulder    BILATERAL SALPINGO OOPHORECTOMY      BLADDER SUSPENSION  2018    CARPAL TUNNEL RELEASE Bilateral     2007    CATARACT EXTRACTION Bilateral 2005    CHOLECYSTECTOMY N/A 2001    COLONOSCOPY  02/2020    Negative    ENDOMETRIAL ABLATION      LAMINECTOMY AND MICRODISCECTOMY LUMBAR SPINE  12/21/2021    herniated disc    REPLACEMENT TOTAL KNEE Right 05/02/2022    TOTAL LAPAROSCOPIC HYSTERECTOMY  2008       Family History   Problem Relation Age of Onset    Diabetes Mother     Heart failure Mother     Ulcers Father     Skin cancer Father     Other Father         Heart issues    No Known Problems Sister     Arthritis Sister      "Alzheimer's disease Sister     No Known Problems Sister     Lung cancer Brother     Other Brother         Heart issues    Hypertension Brother     Post-traumatic stress disorder Brother     Other Brother         Heart issues    Diabetes Brother     No Known Problems Maternal Grandmother        Social History     Socioeconomic History    Marital status:    Tobacco Use    Smoking status: Former     Current packs/day: 0.00     Average packs/day: 0.5 packs/day for 10.0 years (5.0 ttl pk-yrs)     Types: Cigarettes     Start date:      Quit date:      Years since quittin.2     Passive exposure: Past    Smokeless tobacco: Never   Vaping Use    Vaping status: Never Used   Substance and Sexual Activity    Alcohol use: Yes     Comment: Rare occasion    Drug use: Never    Sexual activity: Defer        Allergies   Allergen Reactions    Ertapenem Other (See Comments) and Delirium     VISION CHANGES (SEEING LIGHTS)    Macrobid [Nitrofurantoin] Hives    Methotrexate Other (See Comments)     TONGUE LESION     Celebrex [Celecoxib] Other (See Comments)     ARTHRALGIA     Ace Inhibitors Cough     (SIDE EFFECT)    Mirabegron Rash         Objective   Vital Signs:   /68 (BP Location: Left arm, Patient Position: Sitting, Cuff Size: Adult)   Pulse 62   Temp 96.9 °F (36.1 °C) (Temporal)   Resp 20   Ht 165.1 cm (65\")   Wt 69 kg (152 lb 3.2 oz)   SpO2 100%   BMI 25.33 kg/m²       Physical Exam  Vitals and nursing note reviewed.   Constitutional:       General: She is not in acute distress.     Appearance: Normal appearance. She is not ill-appearing, toxic-appearing or diaphoretic.   HENT:      Head: Normocephalic and atraumatic.      Jaw: There is normal jaw occlusion.      Right Ear: Hearing and external ear normal.      Left Ear: Hearing and external ear normal.      Nose: Nose normal.      Mouth/Throat:      Lips: Pink.   Eyes:      General: Lids are normal. Vision grossly intact. Gaze aligned " appropriately.      Extraocular Movements: Extraocular movements intact.      Conjunctiva/sclera: Conjunctivae normal.      Pupils: Pupils are equal, round, and reactive to light.   Cardiovascular:      Rate and Rhythm: Normal rate and regular rhythm.      Pulses: Normal pulses.           Carotid pulses are 2+ on the right side and 2+ on the left side.       Radial pulses are 2+ on the right side and 2+ on the left side.        Dorsalis pedis pulses are 2+ on the right side and 2+ on the left side.        Posterior tibial pulses are 2+ on the right side and 2+ on the left side.      Heart sounds: Normal heart sounds, S1 normal and S2 normal. No murmur heard.  Pulmonary:      Effort: Pulmonary effort is normal.      Breath sounds: Normal breath sounds and air entry.   Abdominal:      General: Abdomen is flat. Bowel sounds are normal. There is no distension or abdominal bruit.      Palpations: Abdomen is soft.      Tenderness: There is no abdominal tenderness.   Musculoskeletal:         General: Normal range of motion.      Cervical back: Full passive range of motion without pain, normal range of motion and neck supple.      Right lower leg: No edema.      Left lower leg: No edema.   Skin:     General: Skin is warm and dry.      Capillary Refill: Capillary refill takes less than 2 seconds.      Coloration: Skin is not cyanotic or pale.      Findings: No bruising, erythema or rash.   Neurological:      General: No focal deficit present.      Mental Status: She is alert and oriented to person, place, and time. Mental status is at baseline.      GCS: GCS eye subscore is 4. GCS verbal subscore is 5. GCS motor subscore is 6.      Cranial Nerves: Cranial nerves 2-12 are intact. No cranial nerve deficit.      Sensory: Sensation is intact. No sensory deficit.      Motor: Motor function is intact. No weakness.      Coordination: Coordination is intact. Coordination normal.      Gait: Gait is intact. Gait normal.      Deep  Tendon Reflexes: Reflexes normal.   Psychiatric:         Attention and Perception: Attention and perception normal.         Mood and Affect: Mood and affect normal.         Speech: Speech normal.         Behavior: Behavior normal. Behavior is cooperative.         Thought Content: Thought content normal.         Cognition and Memory: Cognition and memory normal.         Judgment: Judgment normal.                 Assessment and Plan   Diagnoses and all orders for this visit:    1. Benign hypertension (Primary)  -     metoprolol succinate XL (Toprol XL) 100 MG 24 hr tablet; Take 1 tablet by mouth Daily.  Dispense: 90 tablet; Refill: 0    2. Gastroesophageal reflux disease without esophagitis  -     omeprazole (priLOSEC) 20 MG capsule; Take 1 capsule by mouth 2 (Two) Times a Day.  Dispense: 180 capsule; Refill: 0    3. HEAVEN (generalized anxiety disorder)  -     venlafaxine XR (Effexor XR) 75 MG 24 hr capsule; Take 1 capsule by mouth Daily. Take with Venlafaxine XR 37.5 mg daily  Dispense: 90 capsule; Refill: 1  -     venlafaxine XR (Effexor XR) 37.5 MG 24 hr capsule; Take 1 capsule by mouth Daily. Take with Venlafaxine XR 75 mg, daily  Dispense: 90 capsule; Refill: 1    4. Major depressive disorder in remission, unspecified whether recurrent  -     venlafaxine XR (Effexor XR) 75 MG 24 hr capsule; Take 1 capsule by mouth Daily. Take with Venlafaxine XR 37.5 mg daily  Dispense: 90 capsule; Refill: 1  -     venlafaxine XR (Effexor XR) 37.5 MG 24 hr capsule; Take 1 capsule by mouth Daily. Take with Venlafaxine XR 75 mg, daily  Dispense: 90 capsule; Refill: 1    5. Mixed hyperlipidemia  -     simvastatin (ZOCOR) 20 MG tablet; Take 1 tablet by mouth Every Night.  Dispense: 90 tablet; Refill: 0    6. Encounter for medication refill  -     venlafaxine XR (Effexor XR) 75 MG 24 hr capsule; Take 1 capsule by mouth Daily. Take with Venlafaxine XR 37.5 mg daily  Dispense: 90 capsule; Refill: 1  -     venlafaxine XR (Effexor XR) 37.5  MG 24 hr capsule; Take 1 capsule by mouth Daily. Take with Venlafaxine XR 75 mg, daily  Dispense: 90 capsule; Refill: 1  -     simvastatin (ZOCOR) 20 MG tablet; Take 1 tablet by mouth Every Night.  Dispense: 90 tablet; Refill: 0  -     omeprazole (priLOSEC) 20 MG capsule; Take 1 capsule by mouth 2 (Two) Times a Day.  Dispense: 180 capsule; Refill: 0  -     metoprolol succinate XL (Toprol XL) 100 MG 24 hr tablet; Take 1 tablet by mouth Daily.  Dispense: 90 tablet; Refill: 0    7. Screening for depression    Other orders  -     amLODIPine (NORVASC) 10 MG tablet; Take 1 tablet by mouth Daily.  Dispense: 90 tablet; Refill: 0    Hypertension  -BP mildly elevated but stable.  Patient asymptomatic.  -Continue current dose of metoprolol 100 mg daily.  -Increase amlodipine to 10 mg daily.    Mixed hyperlipidemia  -On simvastatin.  Refilled today.    Major depressive disorder  -Stable.  Continue current dose of venlafaxine 75+37.5 daily.  Refilled today.    Generalized anxiety disorder  -Stable.  Continue current dose of venlafaxine 75+37.5 daily.  Refilled today.    GERD  -Stable.  Continue current dose of omeprazole 20 mg daily.  Refilled today.    -Discussed ER red flags.  -Instructed patient to follow-up with me in 6 weeks for hypertension and fasting labs at that appointment.    Follow Up   Return in about 6 weeks (around 4/21/2025) for Recheck.    There are no Patient Instructions on file for this visit.

## 2025-04-21 ENCOUNTER — TELEPHONE (OUTPATIENT)
Dept: FAMILY MEDICINE CLINIC | Facility: CLINIC | Age: 75
End: 2025-04-21
Payer: OTHER GOVERNMENT

## 2025-04-21 NOTE — TELEPHONE ENCOUNTER
Patient called on call service at 7:37 AM reporting that she woke up with vomiting and diarrhea today.  She will be unable to make it to her appointment at 915 and requested a call back to reschedule.

## 2025-04-29 ENCOUNTER — OFFICE VISIT (OUTPATIENT)
Dept: FAMILY MEDICINE CLINIC | Facility: CLINIC | Age: 75
End: 2025-04-29
Payer: OTHER GOVERNMENT

## 2025-04-29 VITALS
RESPIRATION RATE: 14 BRPM | DIASTOLIC BLOOD PRESSURE: 75 MMHG | BODY MASS INDEX: 25.33 KG/M2 | OXYGEN SATURATION: 98 % | WEIGHT: 152 LBS | TEMPERATURE: 97.4 F | SYSTOLIC BLOOD PRESSURE: 139 MMHG | HEART RATE: 67 BPM | HEIGHT: 65 IN

## 2025-04-29 DIAGNOSIS — F32.A MILD DEPRESSION: ICD-10-CM

## 2025-04-29 DIAGNOSIS — G47.00 INSOMNIA, UNSPECIFIED TYPE: ICD-10-CM

## 2025-04-29 DIAGNOSIS — I10 BENIGN HYPERTENSION: Primary | ICD-10-CM

## 2025-04-29 DIAGNOSIS — Z13.31 SCREENING FOR DEPRESSION: ICD-10-CM

## 2025-04-29 DIAGNOSIS — K21.9 GASTROESOPHAGEAL REFLUX DISEASE WITHOUT ESOPHAGITIS: ICD-10-CM

## 2025-04-29 DIAGNOSIS — Z76.0 ENCOUNTER FOR MEDICATION REFILL: ICD-10-CM

## 2025-04-29 DIAGNOSIS — F41.1 GAD (GENERALIZED ANXIETY DISORDER): ICD-10-CM

## 2025-04-29 DIAGNOSIS — E78.2 MIXED HYPERLIPIDEMIA: ICD-10-CM

## 2025-04-29 DIAGNOSIS — E55.9 VITAMIN D DEFICIENCY: ICD-10-CM

## 2025-04-29 LAB — TSH SERPL DL<=0.05 MIU/L-ACNC: 2.09 UIU/ML (ref 0.27–4.2)

## 2025-04-29 PROCEDURE — 80061 LIPID PANEL: CPT | Performed by: NURSE PRACTITIONER

## 2025-04-29 PROCEDURE — 83036 HEMOGLOBIN GLYCOSYLATED A1C: CPT | Performed by: NURSE PRACTITIONER

## 2025-04-29 PROCEDURE — 83735 ASSAY OF MAGNESIUM: CPT | Performed by: NURSE PRACTITIONER

## 2025-04-29 PROCEDURE — 84443 ASSAY THYROID STIM HORMONE: CPT | Performed by: NURSE PRACTITIONER

## 2025-04-29 PROCEDURE — 80053 COMPREHEN METABOLIC PANEL: CPT | Performed by: NURSE PRACTITIONER

## 2025-04-29 PROCEDURE — 82306 VITAMIN D 25 HYDROXY: CPT | Performed by: NURSE PRACTITIONER

## 2025-04-29 RX ORDER — AMLODIPINE BESYLATE 5 MG/1
5 TABLET ORAL DAILY
Qty: 90 TABLET | Refills: 0 | Status: SHIPPED | OUTPATIENT
Start: 2025-04-29

## 2025-04-29 RX ORDER — RAMELTEON 8 MG/1
8 TABLET ORAL NIGHTLY
Qty: 90 TABLET | Refills: 0 | Status: SHIPPED | OUTPATIENT
Start: 2025-04-29

## 2025-04-29 NOTE — PROGRESS NOTES
Venipuncture Blood Specimen Collection  Venipuncture performed in RT ARM by Jia Gil with good hemostasis. Patient tolerated the procedure well without complications.   04/29/25   Jia Gil

## 2025-04-29 NOTE — PROGRESS NOTES
Marylin Gay  [id number]  1950  female     04/29/2025      Chief Complaint  Hypertension (Pt is here for a 1 month f/u to check her BP and labs.)    History of Present Illness  75-year-old female patient presents today to follow-up on hypertension and hyperlipidemia.  Here to recheck her blood pressure.  Blood pressure stable today.  Patient has home blood pressure readings which are stable.  Some blood pressure readings 120s over 70s and some blood pressure readings 140s over 80s in which patient contributes those being due to stressed out.  Depressed about granddaughter moving soon.  Screened for depression, positive, see questionnaire.  Denies suicidal ideation/plan.  States she is fasting for labs today.  Denies fever, headache, lightheadedness, dizziness, numbness, tingling, cough, shortness of breath, chest pain, palpitations, leg swelling, abdominal pain, NVD, dysuria, rash.  Does complain of waking up frequently at night.  Has tried over-the-counter melatonin.  Hypertension              Review of Systems   Constitutional: Negative.    HENT: Negative.     Eyes: Negative.    Respiratory: Negative.     Cardiovascular: Negative.    Gastrointestinal: Negative.    Endocrine: Negative.    Genitourinary: Negative.    Musculoskeletal: Negative.    Skin: Negative.    Neurological: Negative.    Hematological: Negative.    Psychiatric/Behavioral: Negative.         Past Medical History:   Diagnosis Date    History of melanoma     Hypertension     Insomnia 2005    Lumbar herniated disc     RIGHT SIDE - L5-S1       Past Surgical History:   Procedure Laterality Date    APPENDECTOMY N/A 2001    BASAL CELL CARCINOMA EXCISION Right 07/18/2023    Right shoulder    BILATERAL SALPINGO OOPHORECTOMY      BLADDER SUSPENSION  2018    CARPAL TUNNEL RELEASE Bilateral     2007    CATARACT EXTRACTION Bilateral 2005    CHOLECYSTECTOMY N/A 2001    COLONOSCOPY  02/2020    Negative    ENDOMETRIAL ABLATION      LAMINECTOMY AND  "MICRODISCECTOMY LUMBAR SPINE  2021    herniated disc    REPLACEMENT TOTAL KNEE Right 2022    TOTAL LAPAROSCOPIC HYSTERECTOMY  2008       Family History   Problem Relation Age of Onset    Diabetes Mother     Heart failure Mother     Ulcers Father     Skin cancer Father     Other Father         Heart issues    No Known Problems Sister     Arthritis Sister     Alzheimer's disease Sister     No Known Problems Sister     Lung cancer Brother     Other Brother         Heart issues    Hypertension Brother     Post-traumatic stress disorder Brother     Other Brother         Heart issues    Diabetes Brother     No Known Problems Maternal Grandmother        Social History     Socioeconomic History    Marital status:    Tobacco Use    Smoking status: Former     Current packs/day: 0.00     Average packs/day: 0.5 packs/day for 10.0 years (5.0 ttl pk-yrs)     Types: Cigarettes     Start date:      Quit date:      Years since quittin.3     Passive exposure: Past    Smokeless tobacco: Never   Vaping Use    Vaping status: Never Used   Substance and Sexual Activity    Alcohol use: Yes     Comment: Rare occasion    Drug use: Never    Sexual activity: Defer        Allergies   Allergen Reactions    Ertapenem Other (See Comments) and Delirium     VISION CHANGES (SEEING LIGHTS)    Macrobid [Nitrofurantoin] Hives    Methotrexate Other (See Comments)     TONGUE LESION     Celebrex [Celecoxib] Other (See Comments)     ARTHRALGIA     Ace Inhibitors Cough     (SIDE EFFECT)    Mirabegron Rash         Objective   Vital Signs:   /75   Pulse 67   Temp 97.4 °F (36.3 °C) (Temporal)   Resp 14   Ht 165.1 cm (65\")   Wt 68.9 kg (152 lb)   SpO2 98%   BMI 25.29 kg/m²       Physical Exam  Vitals and nursing note reviewed.   Constitutional:       General: She is not in acute distress.     Appearance: Normal appearance. She is not ill-appearing, toxic-appearing or diaphoretic.   HENT:      Head: Normocephalic and " atraumatic.      Jaw: There is normal jaw occlusion.      Right Ear: Hearing and external ear normal.      Left Ear: Hearing and external ear normal.      Nose: Nose normal.      Mouth/Throat:      Lips: Pink.   Eyes:      General: Lids are normal. Vision grossly intact. Gaze aligned appropriately.      Extraocular Movements: Extraocular movements intact.      Conjunctiva/sclera: Conjunctivae normal.      Pupils: Pupils are equal, round, and reactive to light.   Cardiovascular:      Rate and Rhythm: Normal rate and regular rhythm.      Pulses: Normal pulses.           Carotid pulses are 2+ on the right side and 2+ on the left side.       Radial pulses are 2+ on the right side and 2+ on the left side.        Dorsalis pedis pulses are 2+ on the right side and 2+ on the left side.        Posterior tibial pulses are 2+ on the right side and 2+ on the left side.      Heart sounds: Normal heart sounds, S1 normal and S2 normal. No murmur heard.  Pulmonary:      Effort: Pulmonary effort is normal.      Breath sounds: Normal breath sounds and air entry.   Abdominal:      General: Abdomen is flat. Bowel sounds are normal. There is no distension or abdominal bruit.      Palpations: Abdomen is soft.      Tenderness: There is no abdominal tenderness.   Musculoskeletal:         General: Normal range of motion.      Cervical back: Full passive range of motion without pain, normal range of motion and neck supple.      Right lower leg: No edema.      Left lower leg: No edema.   Skin:     General: Skin is warm and dry.      Capillary Refill: Capillary refill takes less than 2 seconds.      Coloration: Skin is not cyanotic or pale.      Findings: No bruising, erythema or rash.   Neurological:      General: No focal deficit present.      Mental Status: She is alert and oriented to person, place, and time. Mental status is at baseline.      GCS: GCS eye subscore is 4. GCS verbal subscore is 5. GCS motor subscore is 6.      Cranial  Nerves: Cranial nerves 2-12 are intact. No cranial nerve deficit.      Sensory: Sensation is intact. No sensory deficit.      Motor: Motor function is intact. No weakness.      Coordination: Coordination is intact. Coordination normal.      Gait: Gait is intact. Gait normal.      Deep Tendon Reflexes: Reflexes normal.   Psychiatric:         Attention and Perception: Attention and perception normal.         Mood and Affect: Mood and affect normal.         Speech: Speech normal.         Behavior: Behavior normal. Behavior is cooperative.         Thought Content: Thought content normal.         Cognition and Memory: Cognition and memory normal.         Judgment: Judgment normal.                 Assessment and Plan   Diagnoses and all orders for this visit:    1. Benign hypertension (Primary)  -     amLODIPine (Norvasc) 5 MG tablet; Take 1 tablet by mouth Daily.  Dispense: 90 tablet; Refill: 0  -     Lipid panel  -     Comprehensive metabolic panel  -     TSH Rfx On Abnormal To Free T4  -     Hemoglobin A1c  -     Magnesium    2. Mixed hyperlipidemia  -     Lipid panel  -     Comprehensive metabolic panel  -     TSH Rfx On Abnormal To Free T4  -     Hemoglobin A1c  -     Magnesium    3. Gastroesophageal reflux disease without esophagitis  -     Lipid panel  -     Comprehensive metabolic panel  -     TSH Rfx On Abnormal To Free T4  -     Hemoglobin A1c  -     Magnesium    4. HEAVEN (generalized anxiety disorder)  -     Lipid panel  -     Comprehensive metabolic panel  -     TSH Rfx On Abnormal To Free T4  -     Hemoglobin A1c  -     Magnesium    5. Mild depression    6. Encounter for medication refill  -     amLODIPine (Norvasc) 5 MG tablet; Take 1 tablet by mouth Daily.  Dispense: 90 tablet; Refill: 0    7. Screening for depression [Z13.31]    8. Vitamin D deficiency  -     Vitamin D 25 hydroxy    9. Insomnia, unspecified type  -     ramelteon (Rozerem) 8 MG tablet; Take 1 tablet by mouth Every Night.  Dispense: 90 tablet;  Refill: 0    Hypertension  - BP stable today.  Continue current antihypertensive medications which include amlodipine and metoprolol.  - Pending fasting labs today.    Mixed hyperlipidemia  - On simvastatin.  - Pending fasting labs today.    GERD  - Stable.  Continue current dose of omeprazole.  - Pending labs today.    Generalized anxiety disorder  - Stable.  Continue current dose of venlafaxine.    Mild depression  - Screen for depression, positive, see questionnaire.  - Denies suicidal ideation/plan.    -Contributes depression due to granddaughter moving soon.  - Continue current dose of venlafaxine.    Vitamin D deficiency  - On over-the-counter vitamin D.  - Pending labs today.    Insomnia  -Has tried over-the-counter melatonin with minimal relief.  - Start Rozerem nightly.    -Discussed ER red flags.  - Instructed patient to follow-up with me in 3 months for hypertension and anxiety/depression.    Follow Up   Return in about 3 months (around 7/29/2025) for Recheck.    There are no Patient Instructions on file for this visit.

## 2025-04-30 LAB
25(OH)D3 SERPL-MCNC: 41.5 NG/ML (ref 30–100)
ALBUMIN SERPL-MCNC: 4.3 G/DL (ref 3.5–5.2)
ALBUMIN/GLOB SERPL: 1.7 G/DL
ALP SERPL-CCNC: 102 U/L (ref 39–117)
ALT SERPL W P-5'-P-CCNC: 18 U/L (ref 1–33)
ANION GAP SERPL CALCULATED.3IONS-SCNC: 10.1 MMOL/L (ref 5–15)
AST SERPL-CCNC: 20 U/L (ref 1–32)
BILIRUB SERPL-MCNC: 0.3 MG/DL (ref 0–1.2)
BUN SERPL-MCNC: 18 MG/DL (ref 8–23)
BUN/CREAT SERPL: 18.8 (ref 7–25)
CALCIUM SPEC-SCNC: 9.4 MG/DL (ref 8.6–10.5)
CHLORIDE SERPL-SCNC: 107 MMOL/L (ref 98–107)
CHOLEST SERPL-MCNC: 181 MG/DL (ref 0–200)
CO2 SERPL-SCNC: 25.9 MMOL/L (ref 22–29)
CREAT SERPL-MCNC: 0.96 MG/DL (ref 0.57–1)
EGFRCR SERPLBLD CKD-EPI 2021: 62.6 ML/MIN/1.73
GLOBULIN UR ELPH-MCNC: 2.5 GM/DL
GLUCOSE SERPL-MCNC: 104 MG/DL (ref 65–99)
HBA1C MFR BLD: 5.2 % (ref 4.8–5.6)
HDLC SERPL-MCNC: 43 MG/DL (ref 40–60)
LDLC SERPL CALC-MCNC: 109 MG/DL (ref 0–100)
LDLC/HDLC SERPL: 2.43 {RATIO}
MAGNESIUM SERPL-MCNC: 2.3 MG/DL (ref 1.6–2.4)
POTASSIUM SERPL-SCNC: 4.5 MMOL/L (ref 3.5–5.2)
PROT SERPL-MCNC: 6.8 G/DL (ref 6–8.5)
SODIUM SERPL-SCNC: 143 MMOL/L (ref 136–145)
TRIGL SERPL-MCNC: 167 MG/DL (ref 0–150)
VLDLC SERPL-MCNC: 29 MG/DL (ref 5–40)

## 2025-05-20 DIAGNOSIS — Z76.0 ENCOUNTER FOR MEDICATION REFILL: ICD-10-CM

## 2025-05-20 DIAGNOSIS — Z96.651 HISTORY OF TOTAL RIGHT KNEE REPLACEMENT: ICD-10-CM

## 2025-05-20 DIAGNOSIS — M06.4 INFLAMMATORY POLYARTHROPATHY: ICD-10-CM

## 2025-05-20 RX ORDER — MELOXICAM 15 MG/1
15 TABLET ORAL DAILY PRN
Qty: 30 TABLET | Refills: 1 | Status: SHIPPED | OUTPATIENT
Start: 2025-05-20

## 2025-05-20 NOTE — TELEPHONE ENCOUNTER
Rx Refill Note  Requested Prescriptions     Pending Prescriptions Disp Refills    meloxicam (MOBIC) 15 MG tablet 30 tablet 1     Sig: Take 1 tablet by mouth Daily As Needed (ARTHRITIS).      Last office visit with prescribing clinician: 4/29/2025   Last telemedicine visit with prescribing clinician: Visit date not found   Next office visit with prescribing clinician: 7/31/2025                         Would you like a call back once the refill request has been completed: [] Yes [] No    If the office needs to give you a call back, can they leave a voicemail: [] Yes [] No    Caryn Newton MA  05/20/25, 14:40 EDT

## 2025-05-20 NOTE — TELEPHONE ENCOUNTER
Caller: Marylin Gay    Relationship: Self    Best call back number: [phone]    Requested Prescriptions:   Requested Prescriptions     Pending Prescriptions Disp Refills    meloxicam (MOBIC) 15 MG tablet 30 tablet 1     Sig: Take 1 tablet by mouth Daily As Needed (ARTHRITIS).        Pharmacy where request should be sent: [facility] [place] [place] - [address] - [phone]  - [phone] FX     Last office visit with prescribing clinician: 4/29/2025   Last telemedicine visit with prescribing clinician: Visit date not found   Next office visit with prescribing clinician: 7/31/2025     Additional details provided by patient: STATED THAT THEY ARE DOWN TO ONE OF THE MEDICATION REMAINING     Does the patient have less than a 3 day supply:  [x] Yes  [] No    Would you like a call back once the refill request has been completed: [] Yes [x] No    If the office needs to give you a call back, can they leave a voicemail: [] Yes [x] No    Jia Foreman Rep   05/20/25 14:26 EDT

## 2025-07-31 ENCOUNTER — OFFICE VISIT (OUTPATIENT)
Dept: FAMILY MEDICINE CLINIC | Facility: CLINIC | Age: 75
End: 2025-07-31
Payer: OTHER GOVERNMENT

## 2025-07-31 VITALS
RESPIRATION RATE: 18 BRPM | OXYGEN SATURATION: 98 % | HEIGHT: 65 IN | HEART RATE: 65 BPM | WEIGHT: 152 LBS | BODY MASS INDEX: 25.33 KG/M2 | DIASTOLIC BLOOD PRESSURE: 74 MMHG | SYSTOLIC BLOOD PRESSURE: 137 MMHG | TEMPERATURE: 97.1 F

## 2025-07-31 DIAGNOSIS — G47.00 INSOMNIA, UNSPECIFIED TYPE: ICD-10-CM

## 2025-07-31 DIAGNOSIS — R10.9 FLANK PAIN: ICD-10-CM

## 2025-07-31 DIAGNOSIS — M06.4 INFLAMMATORY POLYARTHROPATHY: ICD-10-CM

## 2025-07-31 DIAGNOSIS — I10 BENIGN HYPERTENSION: Primary | ICD-10-CM

## 2025-07-31 DIAGNOSIS — Z96.651 HISTORY OF TOTAL RIGHT KNEE REPLACEMENT: ICD-10-CM

## 2025-07-31 DIAGNOSIS — F41.1 GAD (GENERALIZED ANXIETY DISORDER): ICD-10-CM

## 2025-07-31 DIAGNOSIS — N39.0 ACUTE UTI: ICD-10-CM

## 2025-07-31 DIAGNOSIS — Z76.0 ENCOUNTER FOR MEDICATION REFILL: ICD-10-CM

## 2025-07-31 DIAGNOSIS — F32.5 MAJOR DEPRESSIVE DISORDER IN REMISSION, UNSPECIFIED WHETHER RECURRENT: ICD-10-CM

## 2025-07-31 LAB
BILIRUB BLD-MCNC: NEGATIVE MG/DL
CLARITY, POC: CLEAR
COLOR UR: YELLOW
EXPIRATION DATE: ABNORMAL
GLUCOSE UR STRIP-MCNC: NEGATIVE MG/DL
KETONES UR QL: NEGATIVE
LEUKOCYTE EST, POC: ABNORMAL
Lab: ABNORMAL
NITRITE UR-MCNC: NEGATIVE MG/ML
PH UR: 5.5 [PH] (ref 5–8)
PROT UR STRIP-MCNC: NEGATIVE MG/DL
RBC # UR STRIP: NEGATIVE /UL
SP GR UR: 1.01 (ref 1–1.03)
UROBILINOGEN UR QL: ABNORMAL

## 2025-07-31 PROCEDURE — 87086 URINE CULTURE/COLONY COUNT: CPT | Performed by: NURSE PRACTITIONER

## 2025-07-31 PROCEDURE — 81003 URINALYSIS AUTO W/O SCOPE: CPT | Performed by: NURSE PRACTITIONER

## 2025-07-31 PROCEDURE — 99214 OFFICE O/P EST MOD 30 MIN: CPT | Performed by: NURSE PRACTITIONER

## 2025-07-31 RX ORDER — VENLAFAXINE HYDROCHLORIDE 75 MG/1
75 CAPSULE, EXTENDED RELEASE ORAL DAILY
Qty: 90 CAPSULE | Refills: 1 | Status: SHIPPED | OUTPATIENT
Start: 2025-07-31

## 2025-07-31 RX ORDER — MELOXICAM 15 MG/1
15 TABLET ORAL DAILY PRN
Qty: 90 TABLET | Refills: 0 | Status: SHIPPED | OUTPATIENT
Start: 2025-07-31

## 2025-07-31 RX ORDER — VENLAFAXINE HYDROCHLORIDE 37.5 MG/1
37.5 CAPSULE, EXTENDED RELEASE ORAL DAILY
Qty: 90 CAPSULE | Refills: 1 | Status: SHIPPED | OUTPATIENT
Start: 2025-07-31

## 2025-07-31 RX ORDER — METOPROLOL SUCCINATE 100 MG/1
100 TABLET, EXTENDED RELEASE ORAL DAILY
Qty: 90 TABLET | Refills: 0 | Status: SHIPPED | OUTPATIENT
Start: 2025-07-31

## 2025-07-31 RX ORDER — AMLODIPINE BESYLATE 5 MG/1
5 TABLET ORAL DAILY
Qty: 90 TABLET | Refills: 0 | Status: SHIPPED | OUTPATIENT
Start: 2025-07-31

## 2025-08-01 RX ORDER — SULFAMETHOXAZOLE AND TRIMETHOPRIM 800; 160 MG/1; MG/1
1 TABLET ORAL 2 TIMES DAILY
Qty: 14 TABLET | Refills: 0 | Status: SHIPPED | OUTPATIENT
Start: 2025-08-01

## 2025-08-02 LAB — BACTERIA SPEC AEROBE CULT: NO GROWTH
